# Patient Record
Sex: FEMALE | Race: WHITE | NOT HISPANIC OR LATINO | Employment: UNEMPLOYED | URBAN - METROPOLITAN AREA
[De-identification: names, ages, dates, MRNs, and addresses within clinical notes are randomized per-mention and may not be internally consistent; named-entity substitution may affect disease eponyms.]

---

## 2017-05-12 ENCOUNTER — GENERIC CONVERSION - ENCOUNTER (OUTPATIENT)
Dept: OTHER | Facility: OTHER | Age: 32
End: 2017-05-12

## 2017-05-12 DIAGNOSIS — Z34.81 ENCOUNTER FOR SUPERVISION OF OTHER NORMAL PREGNANCY, FIRST TRIMESTER: ICD-10-CM

## 2017-05-13 ENCOUNTER — TRANSCRIBE ORDERS (OUTPATIENT)
Dept: ADMINISTRATIVE | Facility: HOSPITAL | Age: 32
End: 2017-05-13

## 2017-05-13 ENCOUNTER — APPOINTMENT (OUTPATIENT)
Dept: LAB | Facility: HOSPITAL | Age: 32
End: 2017-05-13
Payer: COMMERCIAL

## 2017-05-13 DIAGNOSIS — Z34.81 ENCOUNTER FOR SUPERVISION OF OTHER NORMAL PREGNANCY, FIRST TRIMESTER: ICD-10-CM

## 2017-05-13 DIAGNOSIS — Z34.80 PRENATAL CARE, SUBSEQUENT PREGNANCY, UNSPECIFIED TRIMESTER: Primary | ICD-10-CM

## 2017-05-13 LAB
B-HCG SERPL-ACNC: 118 MIU/ML
PROGEST SERPL-MCNC: 19.4 NG/ML

## 2017-05-13 PROCEDURE — 36415 COLL VENOUS BLD VENIPUNCTURE: CPT

## 2017-05-13 PROCEDURE — 84144 ASSAY OF PROGESTERONE: CPT

## 2017-05-13 PROCEDURE — 84702 CHORIONIC GONADOTROPIN TEST: CPT

## 2017-05-15 ENCOUNTER — APPOINTMENT (OUTPATIENT)
Dept: LAB | Facility: HOSPITAL | Age: 32
End: 2017-05-15
Payer: COMMERCIAL

## 2017-05-15 DIAGNOSIS — Z34.80 PRENATAL CARE, SUBSEQUENT PREGNANCY, UNSPECIFIED TRIMESTER: ICD-10-CM

## 2017-05-15 LAB — VENIPUNCTURE: NORMAL

## 2017-05-15 PROCEDURE — 36415 COLL VENOUS BLD VENIPUNCTURE: CPT

## 2017-05-16 ENCOUNTER — LAB CONVERSION - ENCOUNTER (OUTPATIENT)
Dept: OTHER | Facility: OTHER | Age: 32
End: 2017-05-16

## 2017-05-16 LAB
HCG QUANTITATIVE (HISTORICAL): 298 MIU/ML
HCG QUANTITATIVE (HISTORICAL): 298 MIU/ML
PROGESTERONE LEVEL (HISTORICAL): 17.8 NG/ML

## 2017-06-12 ENCOUNTER — ALLSCRIPTS OFFICE VISIT (OUTPATIENT)
Dept: OTHER | Facility: OTHER | Age: 32
End: 2017-06-12

## 2017-06-12 DIAGNOSIS — Z34.81 ENCOUNTER FOR SUPERVISION OF OTHER NORMAL PREGNANCY, FIRST TRIMESTER: ICD-10-CM

## 2017-06-15 ENCOUNTER — LAB CONVERSION - ENCOUNTER (OUTPATIENT)
Dept: OTHER | Facility: OTHER | Age: 32
End: 2017-06-15

## 2017-06-15 LAB
CHLAMYDIA, LIQUID-BASED (HISTORICAL): NOT DETECTED
CULT RSLT GENITAL (HISTORICAL): NORMAL
GC BY MOL. METHOD (HISTORICAL): NOT DETECTED
HPV 18 (HISTORICAL): NOT DETECTED
HPV HIGH RISK 16/18 (HISTORICAL): NOT DETECTED
HPV16 (HISTORICAL): NOT DETECTED
PAP (HISTORICAL): NORMAL
TRICHOMONAS (HISTORICAL): NOT DETECTED

## 2017-07-10 ENCOUNTER — GENERIC CONVERSION - ENCOUNTER (OUTPATIENT)
Dept: OTHER | Facility: OTHER | Age: 32
End: 2017-07-10

## 2017-07-17 ENCOUNTER — GENERIC CONVERSION - ENCOUNTER (OUTPATIENT)
Dept: OTHER | Facility: OTHER | Age: 32
End: 2017-07-17

## 2017-07-31 ENCOUNTER — APPOINTMENT (OUTPATIENT)
Dept: LAB | Facility: CLINIC | Age: 32
End: 2017-07-31
Payer: COMMERCIAL

## 2017-07-31 ENCOUNTER — TRANSCRIBE ORDERS (OUTPATIENT)
Dept: LAB | Facility: CLINIC | Age: 32
End: 2017-07-31
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
## 2017-07-31 LAB
ABO GROUP BLD: NORMAL
BLD GP AB SCN SERPL QL: NEGATIVE
RH BLD: POSITIVE
SPECIMEN EXPIRATION DATE: NORMAL

## 2017-07-31 PROCEDURE — 82950 GLUCOSE TEST: CPT

## 2017-07-31 PROCEDURE — 86644 CMV ANTIBODY: CPT

## 2017-07-31 PROCEDURE — 80081 OBSTETRIC PANEL INC HIV TSTG: CPT

## 2017-07-31 PROCEDURE — 81003 URINALYSIS AUTO W/O SCOPE: CPT

## 2017-07-31 PROCEDURE — 87086 URINE CULTURE/COLONY COUNT: CPT

## 2017-07-31 PROCEDURE — 86803 HEPATITIS C AB TEST: CPT

## 2017-07-31 PROCEDURE — 36415 COLL VENOUS BLD VENIPUNCTURE: CPT

## 2017-07-31 PROCEDURE — 86645 CMV ANTIBODY IGM: CPT

## 2017-08-28 ENCOUNTER — GENERIC CONVERSION - ENCOUNTER (OUTPATIENT)
Dept: OTHER | Facility: OTHER | Age: 32
End: 2017-08-28

## 2017-09-11 ENCOUNTER — GENERIC CONVERSION - ENCOUNTER (OUTPATIENT)
Dept: OTHER | Facility: OTHER | Age: 32
End: 2017-09-11

## 2017-09-25 ENCOUNTER — GENERIC CONVERSION - ENCOUNTER (OUTPATIENT)
Dept: OTHER | Facility: OTHER | Age: 32
End: 2017-09-25

## 2017-10-30 ENCOUNTER — GENERIC CONVERSION - ENCOUNTER (OUTPATIENT)
Dept: OTHER | Facility: OTHER | Age: 32
End: 2017-10-30

## 2017-10-30 DIAGNOSIS — Z34.92 ENCOUNTER FOR SUPERVISION OF NORMAL PREGNANCY IN SECOND TRIMESTER: ICD-10-CM

## 2017-10-30 DIAGNOSIS — Z34.91 ENCOUNTER FOR SUPERVISION OF NORMAL PREGNANCY IN FIRST TRIMESTER: ICD-10-CM

## 2017-11-13 ENCOUNTER — TRANSCRIBE ORDERS (OUTPATIENT)
Dept: LAB | Facility: CLINIC | Age: 32
End: 2017-11-13

## 2017-11-13 ENCOUNTER — APPOINTMENT (OUTPATIENT)
Dept: LAB | Facility: CLINIC | Age: 32
End: 2017-11-13
Payer: COMMERCIAL

## 2017-11-13 DIAGNOSIS — Z34.91 ENCOUNTER FOR SUPERVISION OF NORMAL PREGNANCY IN FIRST TRIMESTER: ICD-10-CM

## 2017-11-13 LAB
BASOPHILS # BLD AUTO: 0.03 THOUSANDS/ΜL (ref 0–0.1)
BASOPHILS NFR BLD AUTO: 0 % (ref 0–1)
EOSINOPHIL # BLD AUTO: 0.02 THOUSAND/ΜL (ref 0–0.61)
EOSINOPHIL NFR BLD AUTO: 0 % (ref 0–6)
ERYTHROCYTE [DISTWIDTH] IN BLOOD BY AUTOMATED COUNT: 13.1 % (ref 11.6–15.1)
GLUCOSE 1H P 50 G GLC PO SERPL-MCNC: 155 MG/DL
HCT VFR BLD AUTO: 31.2 % (ref 34.8–46.1)
HGB BLD-MCNC: 10.4 G/DL (ref 11.5–15.4)
LYMPHOCYTES # BLD AUTO: 1.44 THOUSANDS/ΜL (ref 0.6–4.47)
LYMPHOCYTES NFR BLD AUTO: 15 % (ref 14–44)
MCH RBC QN AUTO: 30.5 PG (ref 26.8–34.3)
MCHC RBC AUTO-ENTMCNC: 33.3 G/DL (ref 31.4–37.4)
MCV RBC AUTO: 92 FL (ref 82–98)
MONOCYTES # BLD AUTO: 0.71 THOUSAND/ΜL (ref 0.17–1.22)
MONOCYTES NFR BLD AUTO: 7 % (ref 4–12)
NEUTROPHILS # BLD AUTO: 7.45 THOUSANDS/ΜL (ref 1.85–7.62)
NEUTS SEG NFR BLD AUTO: 78 % (ref 43–75)
PLATELET # BLD AUTO: 196 THOUSANDS/UL (ref 149–390)
PMV BLD AUTO: 9 FL (ref 8.9–12.7)
RBC # BLD AUTO: 3.41 MILLION/UL (ref 3.81–5.12)
WBC # BLD AUTO: 9.65 THOUSAND/UL (ref 4.31–10.16)

## 2017-11-13 PROCEDURE — 82950 GLUCOSE TEST: CPT

## 2017-11-13 PROCEDURE — 36415 COLL VENOUS BLD VENIPUNCTURE: CPT

## 2017-11-13 PROCEDURE — 85025 COMPLETE CBC W/AUTO DIFF WBC: CPT

## 2017-11-15 ENCOUNTER — GENERIC CONVERSION - ENCOUNTER (OUTPATIENT)
Dept: OTHER | Facility: OTHER | Age: 32
End: 2017-11-15

## 2017-11-18 ENCOUNTER — APPOINTMENT (OUTPATIENT)
Dept: LAB | Facility: CLINIC | Age: 32
End: 2017-11-18
Payer: COMMERCIAL

## 2017-11-18 DIAGNOSIS — Z34.92 ENCOUNTER FOR SUPERVISION OF NORMAL PREGNANCY IN SECOND TRIMESTER: ICD-10-CM

## 2017-11-18 LAB
GLUCOSE 1H P 100 G GLC PO SERPL-MCNC: 158 MG/DL (ref 65–179)
GLUCOSE 2H P 100 G GLC PO SERPL-MCNC: 192 MG/DL (ref 65–154)
GLUCOSE 3H P 100 G GLC PO SERPL-MCNC: 123 MG/DL (ref 65–139)
GLUCOSE P FAST SERPL-MCNC: 84 MG/DL (ref 65–99)

## 2017-11-18 PROCEDURE — 82952 GTT-ADDED SAMPLES: CPT

## 2017-11-18 PROCEDURE — 36415 COLL VENOUS BLD VENIPUNCTURE: CPT

## 2017-11-18 PROCEDURE — 82948 REAGENT STRIP/BLOOD GLUCOSE: CPT

## 2017-11-18 PROCEDURE — 82951 GLUCOSE TOLERANCE TEST (GTT): CPT

## 2017-11-22 LAB — GLUCOSE SERPL-MCNC: 75 MG/DL (ref 65–140)

## 2017-11-27 ENCOUNTER — GENERIC CONVERSION - ENCOUNTER (OUTPATIENT)
Dept: OTHER | Facility: OTHER | Age: 32
End: 2017-11-27

## 2017-12-11 ENCOUNTER — GENERIC CONVERSION - ENCOUNTER (OUTPATIENT)
Dept: OTHER | Facility: OTHER | Age: 32
End: 2017-12-11

## 2017-12-11 ENCOUNTER — ALLSCRIPTS OFFICE VISIT (OUTPATIENT)
Dept: OTHER | Facility: OTHER | Age: 32
End: 2017-12-11

## 2018-01-04 ENCOUNTER — GENERIC CONVERSION - ENCOUNTER (OUTPATIENT)
Dept: OTHER | Facility: OTHER | Age: 33
End: 2018-01-04

## 2018-01-08 ENCOUNTER — GENERIC CONVERSION - ENCOUNTER (OUTPATIENT)
Dept: OBGYN CLINIC | Facility: CLINIC | Age: 33
End: 2018-01-08

## 2018-01-10 ENCOUNTER — GENERIC CONVERSION - ENCOUNTER (OUTPATIENT)
Dept: OTHER | Facility: OTHER | Age: 33
End: 2018-01-10

## 2018-01-13 VITALS
SYSTOLIC BLOOD PRESSURE: 96 MMHG | DIASTOLIC BLOOD PRESSURE: 60 MMHG | BODY MASS INDEX: 23.57 KG/M2 | HEIGHT: 63 IN | WEIGHT: 133 LBS

## 2018-01-15 ENCOUNTER — ANESTHESIA EVENT (INPATIENT)
Dept: LABOR AND DELIVERY | Facility: HOSPITAL | Age: 33
End: 2018-01-15
Payer: COMMERCIAL

## 2018-01-15 ENCOUNTER — ANESTHESIA (INPATIENT)
Dept: LABOR AND DELIVERY | Facility: HOSPITAL | Age: 33
End: 2018-01-15
Payer: COMMERCIAL

## 2018-01-15 ENCOUNTER — HOSPITAL ENCOUNTER (INPATIENT)
Facility: HOSPITAL | Age: 33
LOS: 2 days | Discharge: HOME | End: 2018-01-17
Attending: OBSTETRICS & GYNECOLOGY | Admitting: OBSTETRICS & GYNECOLOGY
Payer: COMMERCIAL

## 2018-01-15 ENCOUNTER — HOSPITAL ENCOUNTER (OUTPATIENT)
Dept: LABOR AND DELIVERY | Facility: HOSPITAL | Age: 33
Discharge: HOME | End: 2018-01-15
Payer: COMMERCIAL

## 2018-01-15 DIAGNOSIS — Z3A.39 39 WEEKS GESTATION OF PREGNANCY: Primary | ICD-10-CM

## 2018-01-15 PROBLEM — J30.2 ALLERGIC RHINITIS, SEASONAL: Status: ACTIVE | Noted: 2018-01-15

## 2018-01-15 PROBLEM — Z34.93 ENCOUNTER FOR PREGNANCY RELATED EXAMINATION IN THIRD TRIMESTER: Status: ACTIVE | Noted: 2018-01-15

## 2018-01-15 LAB
ABO GROUP BLD: NORMAL
BLD GP AB SCN SERPL QL: NEGATIVE
ERYTHROCYTE [DISTWIDTH] IN BLOOD BY AUTOMATED COUNT: 15.4 % (ref 11.6–15.1)
HCT VFR BLD AUTO: 31.5 % (ref 34.8–46.1)
HGB BLD-MCNC: 10.5 G/DL (ref 11.5–15.4)
MCH RBC QN AUTO: 28.6 PG (ref 26.8–34.3)
MCHC RBC AUTO-ENTMCNC: 33.3 G/DL (ref 31.4–37.4)
MCV RBC AUTO: 86 FL (ref 82–98)
PLATELET # BLD AUTO: 175 THOUSANDS/UL (ref 149–390)
PMV BLD AUTO: 8.9 FL (ref 8.9–12.7)
RBC # BLD AUTO: 3.67 MILLION/UL (ref 3.81–5.12)
RH BLD: POSITIVE
SPECIMEN EXPIRATION DATE: NORMAL
WBC # BLD AUTO: 9.03 THOUSAND/UL (ref 4.31–10.16)

## 2018-01-15 PROCEDURE — 85027 COMPLETE CBC AUTOMATED: CPT | Performed by: OBSTETRICS & GYNECOLOGY

## 2018-01-15 PROCEDURE — 4A1HXCZ MONITORING OF PRODUCTS OF CONCEPTION, CARDIAC RATE, EXTERNAL APPROACH: ICD-10-PCS | Performed by: OBSTETRICS & GYNECOLOGY

## 2018-01-15 PROCEDURE — 86900 BLOOD TYPING SEROLOGIC ABO: CPT | Performed by: OBSTETRICS & GYNECOLOGY

## 2018-01-15 PROCEDURE — 86850 RBC ANTIBODY SCREEN: CPT | Performed by: OBSTETRICS & GYNECOLOGY

## 2018-01-15 PROCEDURE — 86901 BLOOD TYPING SEROLOGIC RH(D): CPT | Performed by: OBSTETRICS & GYNECOLOGY

## 2018-01-15 PROCEDURE — 86592 SYPHILIS TEST NON-TREP QUAL: CPT | Performed by: OBSTETRICS & GYNECOLOGY

## 2018-01-15 RX ORDER — SODIUM CHLORIDE, SODIUM LACTATE, POTASSIUM CHLORIDE, CALCIUM CHLORIDE 600; 310; 30; 20 MG/100ML; MG/100ML; MG/100ML; MG/100ML
125 INJECTION, SOLUTION INTRAVENOUS CONTINUOUS
Status: DISCONTINUED | OUTPATIENT
Start: 2018-01-15 | End: 2018-01-16

## 2018-01-15 RX ORDER — ONDANSETRON 2 MG/ML
4 INJECTION INTRAMUSCULAR; INTRAVENOUS EVERY 6 HOURS PRN
Status: DISCONTINUED | OUTPATIENT
Start: 2018-01-15 | End: 2018-01-16

## 2018-01-15 RX ORDER — OXYTOCIN/RINGER'S LACTATE 30/500 ML
1-30 PLASTIC BAG, INJECTION (ML) INTRAVENOUS
Status: DISCONTINUED | OUTPATIENT
Start: 2018-01-15 | End: 2018-01-16

## 2018-01-15 RX ORDER — LIDOCAINE HYDROCHLORIDE AND EPINEPHRINE 15; 5 MG/ML; UG/ML
INJECTION, SOLUTION EPIDURAL AS NEEDED
Status: DISCONTINUED | OUTPATIENT
Start: 2018-01-15 | End: 2018-01-16 | Stop reason: SURG

## 2018-01-15 RX ADMIN — LIDOCAINE HYDROCHLORIDE AND EPINEPHRINE 3 ML: 15; 5 INJECTION, SOLUTION EPIDURAL at 19:18

## 2018-01-15 RX ADMIN — SODIUM CHLORIDE 5 MILLION UNITS: 0.9 INJECTION, SOLUTION INTRAVENOUS at 15:44

## 2018-01-15 RX ADMIN — SODIUM CHLORIDE, POTASSIUM CHLORIDE, SODIUM LACTATE AND CALCIUM CHLORIDE 125 ML/HR: 600; 310; 30; 20 INJECTION, SOLUTION INTRAVENOUS at 19:35

## 2018-01-15 RX ADMIN — SODIUM CHLORIDE, POTASSIUM CHLORIDE, SODIUM LACTATE AND CALCIUM CHLORIDE 125 ML/HR: 600; 310; 30; 20 INJECTION, SOLUTION INTRAVENOUS at 15:05

## 2018-01-15 RX ADMIN — LIDOCAINE HYDROCHLORIDE AND EPINEPHRINE 3 ML: 15; 5 INJECTION, SOLUTION EPIDURAL at 19:17

## 2018-01-15 RX ADMIN — SODIUM CHLORIDE 2.5 MILLION UNITS: 9 INJECTION, SOLUTION INTRAVENOUS at 19:50

## 2018-01-15 RX ADMIN — SODIUM CHLORIDE 2.5 MILLION UNITS: 9 INJECTION, SOLUTION INTRAVENOUS at 23:53

## 2018-01-15 RX ADMIN — SODIUM CHLORIDE, POTASSIUM CHLORIDE, SODIUM LACTATE AND CALCIUM CHLORIDE 125 ML/HR: 600; 310; 30; 20 INJECTION, SOLUTION INTRAVENOUS at 17:28

## 2018-01-15 RX ADMIN — Medication 1 MILLI-UNITS/MIN: at 15:38

## 2018-01-15 RX ADMIN — ROPIVACAINE HYDROCHLORIDE: 2 INJECTION, SOLUTION EPIDURAL; INFILTRATION at 19:15

## 2018-01-15 NOTE — ANESTHESIA PREPROCEDURE EVALUATION
Review of Systems/Medical History  Patient summary reviewed        Cardiovascular  Negative cardio ROS    Pulmonary  Negative pulmonary ROS ,        GI/Hepatic  Negative GI/hepatic ROS          Negative  ROS        Endo/Other  Negative endo/other ROS      GYN  Negative gynecology ROS          Hematology  Anemia ,     Musculoskeletal  Negative musculoskeletal ROS        Neurology  Negative neurology ROS      Psychology   Negative psychology ROS            Physical Exam    Airway    Mallampati score: II  TM Distance: >3 FB  Neck ROM: full     Dental       Cardiovascular  Comment: Negative ROS, Cardiovascular exam normal    Pulmonary  Pulmonary exam normal     Other Findings        Anesthesia Plan  ASA Score- 2     Anesthesia Type- epidural with ASA Monitors  Additional Monitors:   Airway Plan:         Plan Factors-    Induction- intravenous  Postoperative Plan-     Informed Consent- Anesthetic plan and risks discussed with patient and spouse

## 2018-01-15 NOTE — PLAN OF CARE
BIRTH - VAGINAL/ SECTION     Fetal and maternal status remain reassuring during the birth process [de-identified]     Emotionally satisfying birthing experience for mother/fetus [address] [place] [place] Discharge to home or other facility with appropriate resources Progressing        INFECTION - ADULT     Absence or prevention of progression during hospitalization Progressing     Absence of fever/infection during neutropenic period Progressing        Knowledge Deficit     Patient/family/caregiver demonstrates understanding of disease process, treatment plan, medications, and discharge instructions Progressing        PAIN - ADULT     Verbalizes/displays adequate comfort level or baseline comfort level Progressing        SAFETY ADULT     Patient will remain free of falls Progressing     Maintain or return to baseline ADL function Progressing     Maintain or return mobility status to optimal level Progressing

## 2018-01-15 NOTE — H&P
History & Physical - OB/GYN   Mali Gerard 28 y o  female MRN: [id number]  Unit/Bed#: -01 Encounter: [id number]    28 y o  [id number] at 39w1d by LMP  She is a patient of Dr Puja Robles    Chief complaint:  Elective Induction of Labor, for trial of labor after , successful vaginal birth after  x 1    HPI:  Contractions:  no  Fetal movement:  yes  Vaginal bleeding:   no  Leaking of fluid:  no      Pregnancy Complications:  Patient Active Problem List   Diagnosis    Allergic rhinitis, seasonal    Encounter for pregnancy related examination in third trimester    39 weeks gestation of pregnancy       PMH:  Past Medical History:   Diagnosis Date    Anemia     with pregnancy on iron     Seasonal allergies     Varicella     had as child and received vaccine    Visual impairment     eyewear        PSH:  Past Surgical History:   Procedure Laterality Date     SECTION      KNEE CARTILAGE SURGERY Right            Social Hx:  Henry x 3     OB Hx:  Obstetric History       T2      L2     SAB1   TAB0   Ectopic0   Multiple0   Live Births2       # Outcome Date GA Lbr Calos/2nd Weight Sex Delivery Anes PTL Lv   4 Current            3 2016 7w0d          2 Term 02/23/15 39w0d  3515 g (7 lb 12 oz) F  EPI N ANGEL   1 Term 13 39w0d  4196 g (9 lb 4 oz) F CS-Unspec Spinal N ANGEL          Meds:  No current facility-administered medications on file prior to encounter  No current outpatient prescriptions on file prior to encounter  Allergies:  No Known Allergies    Labs:  Blood type: A+  Antibody: negative  Group B strep: positive  HIV: negative  Hepatitis B: negative  RPR: NR  Rubella: Immune  Varicella Unknown  1 hour Glucose: 155  3 hour Glucose: 84, 158, 192, 123    Physical Exam:  /72   Pulse (!) 114   Temp 97 9 °F (36 6 °C) (Oral)   Resp 18   LMP 2017     Physical Exam   Constitutional: She is oriented to person, place, and time   She appears well-developed and well-nourished  Cardiovascular: Normal rate, regular rhythm and normal heart sounds  Pulmonary/Chest: Effort normal and breath sounds normal    Abdominal: Soft  Bowel sounds are normal  She exhibits distension  Gravid   Neurological: She is alert and oriented to person, place, and time  Estimated Fetal Weight: 8 lbs  Presentation: vertex    SVE: 2 / 60% / -1  FHT:  120 / Moderate 6 - 25 bpm / + accelerations, no decelerations  [place]: irritable    Membranes: intact    Assessment:   28 y o  [id number] at 39w1d for elective induction of labor    Plan:   1  Admit to L&D  2  CBC, RPR, type and screen  3  GBS + status: PCN per protocol  4   Induction of labor: pitocin per protocol    Epidural upon request    Discussed with Dr Adam Corado DO

## 2018-01-16 PROBLEM — O34.219 VBAC, DELIVERED: Status: ACTIVE | Noted: 2018-01-16

## 2018-01-16 LAB
BASE EXCESS BLDCOA CALC-SCNC: -3.4 MMOL/L (ref 3–11)
BASE EXCESS BLDCOV CALC-SCNC: -2.1 MMOL/L (ref 1–9)
HCO3 BLDCOA-SCNC: 24.5 MMOL/L (ref 17.3–27.3)
HCO3 BLDCOV-SCNC: 22.5 MMOL/L (ref 12.2–28.6)
O2 CT VFR BLDCOA CALC: 8.6 ML/DL
OXYHGB MFR BLDCOA: 36.3 %
OXYHGB MFR BLDCOV: 82.9 %
PCO2 BLDCOA: 54.6 MM[HG] (ref 30–60)
PCO2 BLDCOV: 38.8 MM HG (ref 27–43)
PH BLDCOA: 7.27 [PH] (ref 7.23–7.43)
PH BLDCOV: 7.38 [PH] (ref 7.19–7.49)
PO2 BLDCOA: 18.5 MM HG (ref 5–25)
PO2 BLDCOV: 37.1 MM HG (ref 15–45)
RPR SER QL: NORMAL
SAO2 % BLDCOV: 21 ML/DL

## 2018-01-16 PROCEDURE — 82805 BLOOD GASES W/O2 SATURATION: CPT | Performed by: OBSTETRICS & GYNECOLOGY

## 2018-01-16 RX ORDER — OXYCODONE HYDROCHLORIDE AND ACETAMINOPHEN 5; 325 MG/1; MG/1
2 TABLET ORAL EVERY 4 HOURS PRN
Status: DISCONTINUED | OUTPATIENT
Start: 2018-01-16 | End: 2018-01-17 | Stop reason: HOSPADM

## 2018-01-16 RX ORDER — ACETAMINOPHEN 325 MG/1
650 TABLET ORAL EVERY 6 HOURS PRN
Status: DISCONTINUED | OUTPATIENT
Start: 2018-01-16 | End: 2018-01-17 | Stop reason: HOSPADM

## 2018-01-16 RX ORDER — DIPHENHYDRAMINE HYDROCHLORIDE 50 MG/ML
25 INJECTION INTRAMUSCULAR; INTRAVENOUS EVERY 6 HOURS PRN
Status: DISCONTINUED | OUTPATIENT
Start: 2018-01-16 | End: 2018-01-17 | Stop reason: HOSPADM

## 2018-01-16 RX ORDER — ONDANSETRON 2 MG/ML
4 INJECTION INTRAMUSCULAR; INTRAVENOUS EVERY 8 HOURS PRN
Status: DISCONTINUED | OUTPATIENT
Start: 2018-01-16 | End: 2018-01-17 | Stop reason: HOSPADM

## 2018-01-16 RX ORDER — DOCUSATE SODIUM 100 MG/1
100 CAPSULE, LIQUID FILLED ORAL 2 TIMES DAILY
Status: DISCONTINUED | OUTPATIENT
Start: 2018-01-16 | End: 2018-01-17 | Stop reason: HOSPADM

## 2018-01-16 RX ORDER — OXYCODONE HYDROCHLORIDE AND ACETAMINOPHEN 5; 325 MG/1; MG/1
1 TABLET ORAL EVERY 4 HOURS PRN
Status: DISCONTINUED | OUTPATIENT
Start: 2018-01-16 | End: 2018-01-17 | Stop reason: HOSPADM

## 2018-01-16 RX ORDER — IBUPROFEN 600 MG/1
600 TABLET ORAL EVERY 6 HOURS PRN
Status: DISCONTINUED | OUTPATIENT
Start: 2018-01-16 | End: 2018-01-17 | Stop reason: HOSPADM

## 2018-01-16 RX ORDER — CALCIUM CARBONATE 200(500)MG
1000 TABLET,CHEWABLE ORAL DAILY PRN
Status: DISCONTINUED | OUTPATIENT
Start: 2018-01-16 | End: 2018-01-17 | Stop reason: HOSPADM

## 2018-01-16 RX ORDER — DIAPER,BRIEF,INFANT-TODD,DISP
1 EACH MISCELLANEOUS AS NEEDED
Status: DISCONTINUED | OUTPATIENT
Start: 2018-01-16 | End: 2018-01-17 | Stop reason: HOSPADM

## 2018-01-16 RX ADMIN — BENZOCAINE AND MENTHOL: 20; .5 SPRAY TOPICAL at 06:01

## 2018-01-16 RX ADMIN — DOCUSATE SODIUM 100 MG: 100 CAPSULE, LIQUID FILLED ORAL at 17:22

## 2018-01-16 RX ADMIN — IBUPROFEN 600 MG: 600 TABLET ORAL at 08:49

## 2018-01-16 RX ADMIN — DOCUSATE SODIUM 100 MG: 100 CAPSULE, LIQUID FILLED ORAL at 08:49

## 2018-01-16 RX ADMIN — IBUPROFEN 600 MG: 600 TABLET ORAL at 15:04

## 2018-01-16 RX ADMIN — HYDROCORTISONE 1 APPLICATION: 1 CREAM TOPICAL at 06:01

## 2018-01-16 RX ADMIN — WITCH HAZEL 1 PAD: 500 SOLUTION RECTAL; TOPICAL at 06:01

## 2018-01-16 NOTE — LACTATION NOTE
This note was copied from a baby's chart  CONSULT - LACTATION  Baby Girl Shanell Stacy 0 days female MRN: [id number]    [facility] Room / Bed:  311(N)/ 311(N) Encounter: [id number]    Maternal Information     MOTHER:  Stephenie Harvey  Maternal Age: 28 y o    OB History: #: 1, Date: 13, Sex: Female, Weight: 4196 g (9 lb 4 oz), GA: 39w0d, Delivery: , Unspecified, Apgar1: None, Apgar5: None, Living: Living, Birth Comments: None    #: 2, Date: 02/23/15, Sex: Female, Weight: 3515 g (7 lb 12 oz), GA: 39w0d, Delivery: , Spontaneous, Apgar1: None, Apgar5: None, Living: Living, Birth Comments: None    #: 3, Date: , Sex: None, Weight: None, GA: 7w0d, Delivery: None, Apgar1: None, Apgar5: None, Living: None, Birth Comments: None    #: 4, Date: 18, Sex: Female, Weight: 3765 g (8 lb 4 8 oz), GA: 39w2d, Delivery: Vaginal, Spontaneous Delivery, Apgar1: 9, Apgar5: 9, Living: Living, Birth Comments: None   Previouse breast reduction surgery?  No    Lactation history:   Has patient previously breast fed: Yes   How long had patient previously breast fed: 2nd one  for one year, 1st one was in nicu with NG tube exclusively  with pumped breastmilk for five month   Previous breast feeding complications: Infant separation     Past Surgical History:   Procedure Laterality Date     SECTION      KNEE CARTILAGE SURGERY Right            Birth information:  YOB: 2018   Time of birth: 3:42 AM   Sex: female   Delivery type: Vaginal, Spontaneous Delivery   Birth Weight: 3765 g (8 lb 4 8 oz)   Percent of Weight Change: 0%     Gestational Age: 44w2d   [unfilled]    Assessment     Breast and nipple assessment: normal assessment    [place] Assessment: normal assessment    Feeding assessment: feeding well  LATCH:  Latch: Grasps breast, tongue down, lips flanged, rhythmic sucking   Audible Swallowing: Spontaneous and intermittent (24 hours old)   Type of Nipple: Everted (After stimulation)   Comfort (Breast/Nipple): Soft/non-tender   Hold (Positioning): No assist from staff, mother able to position/hold infant   LATCH Score: 10          Feeding recommendations:  breast feed on demand     Met with mother  Provided mother with Ready, Set, Baby booklet  Discussed Skin to Skin contact an benefits to mom and baby  Talked about the delay of the first bath until baby has adjusted  Spoke about the benefits of rooming in  Feeding on cue and what that means for recognizing infant's hunger  Avoidance of pacifiers for the first month discussed  Talked about exclusive breastfeeding for the first 6 months  Positioning and latch reviewed as well as showing images of other feeding positions  Discussed the properties of a good latch in any position  Reviewed hand/manual expression  Discussed s/s that baby is getting enough milk and some s/s that breastfeeding dyad may need further help  Gave information on common concerns, what to expect the first few weeks after delivery, preparing for other caregivers, and how partners can help  Resources for support also provided  Information on hand expression given  Discussed benefits of knowing how to manually express breast including stimulating milk supply, softening nipple for latch and evacuating breast in the event of engorgement  Discussed with MOB how to download the [employer] to utilize feeding log in the application  Encouraged MOB to call for assistance, questions, and concerns about breastfeeding  Extension provided          Ángela Jones RN 1/16/2018 4:51 PM

## 2018-01-16 NOTE — OB LABOR/OXYTOCIN SAFETY PROGRESS
Oxytocin Safety Progress Check Note - Jose Raul Jones 28 y o  female MRN: [id number]    Unit/Bed#: -01 Encounter: [id number]    Obstetric History       T2      L2     SAB1   TAB0   Ectopic0   Multiple0   Live Births2      Gestational Age: 39w1d  Dose (ta-units/min) Oxytocin: 6 ta-units/min  Contraction Frequency (minutes): 4  Contraction Quality: Moderate  Tachysystole: No   Dilation: 3-4        Effacement (%): 90  Station: -1  Baseline Rate: 110 bpm  Fetal Heart Rate: 120 BPM  FHR Category: Category I     Oxytocin Safety Progress Check: Safety check completed    Notes/comments:     Comfortable with epidural  AROM for augmentation  Continue pitocin titration    Nery Coleman MD 1/15/2018 8:25 PM

## 2018-01-16 NOTE — DISCHARGE SUMMARY
DISCHARGE SUMMARY - OB  Bryan Mohr 28 y o  female MRN: [id number]                        Unit/Bed#: -01 Encounter: [id number]      Admission Date: 1/15/2018    Discharge Date: 18    Attending:  Ileana Saldivar    Principal Diagnosis: , delivered    Secondary Diagnosis:   Patient Active Problem List   Diagnosis    Allergic rhinitis, seasonal    Encounter for pregnancy related examination in third trimester    39 weeks gestation of pregnancy    , delivered       Procedures: Stormy Barboza 19 Course: Pt was admitted for elective induction for TOLAC  She was started on Pitocin and labor was augmented with AROM  She successfully delivered a female  by  at [phone] on 2018  Apgars were 9/9  Pt had no lacerations  Baby was admitted to  nursery and mother was transferred to post partum  On discharge,     Complications: None    Condition at discharge: stable    Discharge Medications: For a complete list of the patient's medications, please refer to her medical reconcillation report  Discharge instructions/Information to patient and family:   See after visit summary for information provided to patient and family  Provisions for Follow-Up Care:  See after visit summary for information related to follow-up care and any pertinent home health orders  Disposition: See After Visit Summary for discharge disposition information

## 2018-01-16 NOTE — OB LABOR/OXYTOCIN SAFETY PROGRESS
Oxytocin Safety Progress Check Note - Comfort Juárez 28 y o  female MRN: [id number]    Unit/Bed#: -01 Encounter: [id number]    Obstetric History       T2      L2     SAB1   TAB0   Ectopic0   Multiple0   Live Births2      Gestational Age: 39w2d  Dose (at-units/min) Oxytocin: 14 ta-units/min  Contraction Frequency (minutes): 2-2 5  Contraction Quality: Moderate  Tachysystole: No   Dilation: 6        Effacement (%): 90  Station: -1  Baseline Rate: 105 bpm  Fetal Heart Rate: 110 BPM  FHR Category: Category I     Oxytocin Safety Progress Check: Safety check completed    Notes/comments:   Patient is comfortable with epidural, making cervical change    Continue to monitor  Provider Notified: Yes  Provider Name: Dr Shannon Wooten MD 2018 1:43 AM

## 2018-01-16 NOTE — OB LABOR/OXYTOCIN SAFETY PROGRESS
Oxytocin Safety Progress Check Note - Blanca Nail 28 y o  female MRN: [id number]    Unit/Bed#: -01 Encounter: [id number]    Obstetric History       T2      L2     SAB1   TAB0   Ectopic0   Multiple0   Live Births2      Gestational Age: 39w1d  Dose (ta-units/min) Oxytocin: 10 ta-units/min  Contraction Frequency (minutes): 2-3 5  Contraction Quality: Moderate  Tachysystole: No   Dilation: 4        Effacement (%): 90  Station: -1  Baseline Rate: 105 bpm  Fetal Heart Rate: 110 BPM  FHR Category: Category I     Oxytocin Safety Progress Check: Safety check completed    Notes/comments:   Patient is currently comfortable with epidural, MVUs approximately 100, making cervical change on exam   Continue to monitor    Provider Notified: Yes  Provider Name: Dr Pankaj Granados MD 1/15/2018 11:25 PM

## 2018-01-16 NOTE — L&D DELIVERY NOTE
DELIVERY NOTE  Gabriela De Jesus 28 y o  female MRN: [id number]  Unit/Bed#: -01 Encounter: [id number]    Obstetrician:   Dr Boucher     Assistant: Dr Leeann Youngblood    Pre-Delivery Diagnosis: Elective induction of labor for TOLAC  Term pregnancy    Post-Delivery Diagnosis: Same as above - Delivered  Successful     Procedure: Spontaneous vaginal delivery    Indications for instrumental delivery: none    Estimated Blood Loss:  Minimal           Complications:  none    Specimens: Placenta to storage    Description of Delivery: Patient delivered a viable Female  over intact perineum with no lacerations  After delivery of the fetal head, nuchal cord x1 was assessed and found to be present and removed  With the assistance of maternal expulsive efforts and gentle guidance the  was delivered  After delivery of the , when the pulsations of the umbilical cord had ceased,  the umbilical cord was doubly clamped and cut and the  was passed off to  staff for routine care  Umbilical cord blood and umbilical artery and venous gases were collected  Placenta was delivered with fundal massage and gentle traction on the cord  Placenta delivered intact with a 3-vessel cord  Bleeding was noted to be under control  Inspection of the perineum, vagina, labia, and urethra revealed no lacerations  Mother and baby are currently recovering nicely in stable condition, and bonding with skin to skin  All needles, sponges and instruments were accounted for x2  Dr Rae was present and scrubbed in throughout the entire procedure            Agree with above

## 2018-01-16 NOTE — OB LABOR/OXYTOCIN SAFETY PROGRESS
Oxytocin Safety Progress Check Note - Loren Sharpe 28 y o  female MRN: [id number]    Unit/Bed#: -01 Encounter: [id number]    Obstetric History       T2      L2     SAB1   TAB0   Ectopic0   Multiple0   Live Births2      Gestational Age: 39w1d  Dose (ta-units/min) Oxytocin: 8 ta-units/min  Contraction Frequency (minutes): 3  Contraction Quality: Moderate  Tachysystole: No   Dilation: 3-4        Effacement (%): 90  Station: -1  Baseline Rate: 110 bpm  Fetal Heart Rate: 135 BPM  FHR Category: Category I     Oxytocin Safety Progress Check: Safety check completed    Notes/comments:     Comfortable with epidural  No cervical change  IUPC placed to help guide pitocin titration  Continue to observe     Lary Rebolledo MD 1/15/2018 9:23 PM

## 2018-01-16 NOTE — ANESTHESIA PROCEDURE NOTES
Epidural Block    Patient location during procedure: OB  Start time: 1/15/2018 7:22 PM  Reason for block: procedure for pain and at surgeon's request  Staffing  Anesthesiologist: Petar Bates  Performed: anesthesiologist   Preanesthetic Checklist  Completed: patient identified, site marked, surgical consent, pre-op evaluation, timeout performed, IV checked, risks and benefits discussed and monitors and equipment checked  Epidural  Patient position: sitting  Prep: Betadine  Patient monitoring: cardiac monitor, frequent blood pressure checks and continuous pulse ox  Approach: midline  Location: lumbar (1-5)  Injection technique: KUSHAL air  Needle  Needle type: Tuohy   Needle gauge: 18 G  Catheter type: side hole  Catheter size: 20 G  Catheter at skin depth: 11 cm  Test dose: negative and lidocaine 1 5% with epinephrine 1-to-200,000  Assessment  Sensory level: [id number] aspiration for CSF, negative aspiration for heme and no paresthesia on injection  patient tolerated the procedure well with no immediate complications

## 2018-01-16 NOTE — PLAN OF CARE
BIRTH - VAGINAL/ SECTION     Fetal and maternal status remain reassuring during the birth process Completed     Emotionally satisfying birthing experience for mother/fetus Completed          DISCHARGE PLANNING     Discharge to home or other facility with appropriate resources Progressing        INFECTION - ADULT     Absence or prevention of progression during hospitalization Progressing     Absence of fever/infection during neutropenic period Progressing        Knowledge Deficit     Patient/family/caregiver demonstrates understanding of disease process, treatment plan, medications, and discharge instructions Progressing        PAIN - ADULT     Verbalizes/displays adequate comfort level or baseline comfort level Progressing        POSTPARTUM     Experiences normal postpartum course Progressing     Appropriate maternal -  bonding Progressing     Establishment of infant feeding pattern Progressing     Incision(s), wounds(s) or drain site(s) healing without S/S of infection Progressing        SAFETY ADULT     Patient will remain free of falls Progressing     Maintain or return to baseline ADL function Progressing     Maintain or return mobility status to optimal level Progressing

## 2018-01-16 NOTE — OB LABOR/OXYTOCIN SAFETY PROGRESS
Oxytocin Safety Progress Check Note - Maricruz Ellis 28 y o  female MRN: [id number]    Unit/Bed#: -01 Encounter: [id number]    Obstetric History       T2      L2     SAB1   TAB0   Ectopic0   Multiple0   Live Births2      Gestational Age: 39w2d  Dose (ta-units/min) Oxytocin: 14 ta-units/min  Contraction Frequency (minutes): 1-5  Contraction Quality: Moderate  Tachysystole: No   Dilation: 7        Effacement (%): 90  Station: 0  Baseline Rate: 120 bpm  Fetal Heart Rate: 110 BPM  FHR Category: Category I     Oxytocin Safety Progress Check: Safety check completed    Notes/comments:     Provider Notified: Yes  Provider Name: Dr Bishop Taylor    Patient feeling increased pressure  Continue current management      Grzegorz Tamayo MD 2018 2:45 AM

## 2018-01-16 NOTE — SOCIAL WORK
Breast pump consult  Spoke with pt who states she is interested in a very new pump called [facility] which is a hands-free, tube-free, cordless, portable system  Pt states she will contact her insurance to determine if she can order pump herself and submit a claim for reimbursement  Pt states she has Rx from her OB  Pt denies any CM needs at this time  Pt has CM contact info and is to call CM if any needs before d/c  No other needs noted at this time

## 2018-01-17 VITALS
HEART RATE: 70 BPM | DIASTOLIC BLOOD PRESSURE: 68 MMHG | TEMPERATURE: 97.5 F | RESPIRATION RATE: 18 BRPM | BODY MASS INDEX: 29.77 KG/M2 | SYSTOLIC BLOOD PRESSURE: 104 MMHG | OXYGEN SATURATION: 97 % | HEIGHT: 63 IN | WEIGHT: 168 LBS

## 2018-01-17 RX ORDER — ACETAMINOPHEN 325 MG/1
TABLET ORAL
Qty: 30 TABLET | Refills: 0
Start: 2018-01-17 | End: 2018-02-16

## 2018-01-17 RX ORDER — DOCUSATE SODIUM 100 MG/1
100 CAPSULE, LIQUID FILLED ORAL 2 TIMES DAILY
Qty: 10 CAPSULE | Refills: 0
Start: 2018-01-17 | End: 2018-02-16

## 2018-01-17 RX ORDER — DIAPER,BRIEF,INFANT-TODD,DISP
1 EACH MISCELLANEOUS 4 TIMES DAILY PRN
Qty: 30 G | Refills: 0
Start: 2018-01-17 | End: 2018-02-16

## 2018-01-17 RX ORDER — IBUPROFEN 600 MG/1
600 TABLET ORAL EVERY 6 HOURS PRN
Qty: 30 TABLET | Refills: 0
Start: 2018-01-17 | End: 2018-02-16

## 2018-01-17 RX ADMIN — IBUPROFEN 600 MG: 600 TABLET ORAL at 08:26

## 2018-01-17 RX ADMIN — DOCUSATE SODIUM 100 MG: 100 CAPSULE, LIQUID FILLED ORAL at 08:26

## 2018-01-17 RX ADMIN — IBUPROFEN 600 MG: 600 TABLET ORAL at 01:00

## 2018-01-17 NOTE — DISCHARGE INSTRUCTIONS
Vaginal Delivery   WHAT YOU SHOULD KNOW:   A vaginal delivery is the birth of your baby through your vagina (birth canal)  AFTER YOU LEAVE:   Medicines:  · NSAIDs  help decrease swelling and pain or fever  This medicine is available with or without a doctor's order  NSAIDs can cause stomach bleeding or kidney problems in certain people  If you take blood thinner medicine, always ask your healthcare provider if NSAIDs are safe for you  Always read the medicine label and follow directions  · Take your medicine as directed  Call your healthcare provider if you think your medicine is not helping or if you have side effects  Tell him if you are allergic to any medicine  Keep a list of the medicines, vitamins, and herbs you take  Include the amounts, and when and why you take them  Bring the list or the pill bottles to follow-up visits  Carry your medicine list with you in case of an emergency  Follow up with your primary healthcare provider:  Most women need to return 6 weeks after a vaginal delivery  Ask about how to care for your wounds or stitches  Write down your questions so you remember to ask them during your visits  Activity:  Rest as much as possible  Try to keep all activities short  You may be able to do some exercise soon after you have your baby  Talk with your primary healthcare provider before you start exercising  If you work outside the home, ask when you can return to your job  Kegel exercises:  Kegel exercises may help your vaginal and rectal muscles heal faster  You can do Kegel exercises by tightening and relaxing the muscles around your vagina  Kegel exercises help make the muscles stronger  Breast care:  When your milk comes in, your breasts may feel full and hard  Ask how to care for your breasts, even if you are not breastfeeding  Constipation:  Do not try to push the bowel movement out if it is too hard   High-fiber foods, extra liquids, and regular exercise can help you prevent constipation  Examples of high-fiber foods are fruit and bran  Prune juice and water are good liquids to drink  Regular exercise helps your digestive system work  You may also be told to take over-the-counter fiber and stool softener medicines  Take these items as directed  Hemorrhoids:  Pregnancy can cause severe hemorrhoids  You may have rectal pain because of the hemorrhoids  Ask how to prevent or treat hemorrhoids  Perineum care: Your perineum is the area between your vagina and anus  Keep the area clean and dry to help it heal and to prevent infection  Wash the area gently with soap and water when you bathe or shower  Rinse your perineum with warm water when you use the toilet  Your primary healthcare provider may suggest you use a warm sitz bath to help decrease pain  A sitz bath is a bathtub or basin filled to hip level  Stay in the sitz bath for 20 to 30 minutes, or as directed  Vaginal discharge: You will have vaginal discharge, called lochia, after your delivery  The lochia is bright red the first day or two after the birth  By the fourth day, the amount decreases, and it turns red-brown  Use a sanitary pad rather than a tampon to prevent a vaginal infection  It is normal to have lochia up to 8 weeks after your baby is born  Monthly periods: Your period may start again within 7 to 12 weeks after your baby is born  If you are breastfeeding, it may take longer for your period to start again  You can still get pregnant again even though you do not have your monthly period  Talk with your primary healthcare provider about a birth control method that will be good for you if you do not want to get pregnant  Mood changes: Many new mothers have some kind of mood changes after delivery  Some of these changes occur because of lack of sleep, hormone changes, and caring for a new baby  Some mood changes can be more serious, such as postpartum depression   Talk with your primary healthcare provider if you feel unable to care for yourself or your baby  Sexual activity:  You may need to avoid sex for 6 to 7 weeks after you have your baby  You may notice you have a decreased desire for sex, or sex may be painful  You may need to use a vaginal lubricant (gel) to help make sex more comfortable  Contact your primary healthcare provider if:   · You have heavy vaginal bleeding that fills 1 or more sanitary pads in 1 hour  · You have a fever  · Your pain does not go away, or gets worse  · The skin between your vagina and rectum is swollen, warm, or red  · You have swollen, hard, or painful breasts  · You feel very sad or depressed  · You feel more tired than usual      · You have questions or concerns about your condition or care  Seek care immediately or call 911 if:   · You have pus or yellow drainage coming from your vagina or wound  · You are urinating very little, or not at all  · Your arm or leg feels warm, tender, and painful  It may look swollen and red  · You feel lightheaded, have sudden and worsening chest pain, or trouble breathing  You may have more pain when you take deep breaths or cough, or you may cough up blood  © 2014 [address] is for End User's use only and may not be sold, redistributed or otherwise used for commercial purposes  All illustrations and images included in CareNotes® are the copyrighted property of [employer]  or [employer]  The above information is an  only  It is not intended as medical advice for individual conditions or treatments  Talk to your doctor, nurse or pharmacist before following any medical regimen to see if it is safe and effective for you

## 2018-01-20 ENCOUNTER — OB ABSTRACT (OUTPATIENT)
Dept: OBGYN CLINIC | Facility: CLINIC | Age: 33
End: 2018-01-20

## 2018-01-22 VITALS
BODY MASS INDEX: 27.82 KG/M2 | HEIGHT: 63 IN | WEIGHT: 157 LBS | SYSTOLIC BLOOD PRESSURE: 118 MMHG | DIASTOLIC BLOOD PRESSURE: 62 MMHG

## 2018-01-22 VITALS
SYSTOLIC BLOOD PRESSURE: 108 MMHG | BODY MASS INDEX: 28.53 KG/M2 | HEIGHT: 63 IN | DIASTOLIC BLOOD PRESSURE: 60 MMHG | WEIGHT: 161 LBS

## 2018-01-22 VITALS
SYSTOLIC BLOOD PRESSURE: 116 MMHG | WEIGHT: 162 LBS | BODY MASS INDEX: 28.7 KG/M2 | DIASTOLIC BLOOD PRESSURE: 58 MMHG | HEIGHT: 63 IN

## 2018-01-22 VITALS — SYSTOLIC BLOOD PRESSURE: 100 MMHG | WEIGHT: 141 LBS | BODY MASS INDEX: 24.98 KG/M2 | DIASTOLIC BLOOD PRESSURE: 62 MMHG

## 2018-01-22 VITALS — WEIGHT: 132 LBS | DIASTOLIC BLOOD PRESSURE: 62 MMHG | BODY MASS INDEX: 23.38 KG/M2 | SYSTOLIC BLOOD PRESSURE: 100 MMHG

## 2018-01-22 VITALS
DIASTOLIC BLOOD PRESSURE: 62 MMHG | HEIGHT: 63 IN | SYSTOLIC BLOOD PRESSURE: 98 MMHG | WEIGHT: 149 LBS | BODY MASS INDEX: 26.4 KG/M2

## 2018-01-24 ENCOUNTER — TELEPHONE (OUTPATIENT)
Dept: OBGYN CLINIC | Facility: CLINIC | Age: 33
End: 2018-01-24

## 2018-01-24 VITALS — SYSTOLIC BLOOD PRESSURE: 110 MMHG | BODY MASS INDEX: 28.7 KG/M2 | WEIGHT: 162 LBS | DIASTOLIC BLOOD PRESSURE: 70 MMHG

## 2018-01-24 VITALS
WEIGHT: 169 LBS | DIASTOLIC BLOOD PRESSURE: 66 MMHG | HEIGHT: 63 IN | SYSTOLIC BLOOD PRESSURE: 116 MMHG | BODY MASS INDEX: 29.95 KG/M2

## 2018-01-24 VITALS — BODY MASS INDEX: 29.76 KG/M2 | WEIGHT: 168 LBS | SYSTOLIC BLOOD PRESSURE: 112 MMHG | DIASTOLIC BLOOD PRESSURE: 74 MMHG

## 2018-01-24 LAB — PLACENTA IN STORAGE: NORMAL

## 2018-02-15 ENCOUNTER — OB ABSTRACT (OUTPATIENT)
Dept: OBGYN CLINIC | Facility: CLINIC | Age: 33
End: 2018-02-15

## 2018-02-16 ENCOUNTER — POSTPARTUM VISIT (OUTPATIENT)
Dept: OBGYN CLINIC | Facility: CLINIC | Age: 33
End: 2018-02-16

## 2018-02-16 VITALS — WEIGHT: 148 LBS | BODY MASS INDEX: 26.22 KG/M2 | DIASTOLIC BLOOD PRESSURE: 66 MMHG | SYSTOLIC BLOOD PRESSURE: 98 MMHG

## 2018-02-16 DIAGNOSIS — O34.219 VBAC, DELIVERED: ICD-10-CM

## 2018-02-16 PROBLEM — Z3A.39 39 WEEKS GESTATION OF PREGNANCY: Status: RESOLVED | Noted: 2018-01-15 | Resolved: 2018-02-16

## 2018-02-16 PROBLEM — Z34.93 ENCOUNTER FOR PREGNANCY RELATED EXAMINATION IN THIRD TRIMESTER: Status: RESOLVED | Noted: 2018-01-15 | Resolved: 2018-02-16

## 2018-02-16 PROCEDURE — 99024 POSTOP FOLLOW-UP VISIT: CPT | Performed by: OBSTETRICS & GYNECOLOGY

## 2018-02-16 NOTE — PROGRESS NOTES
Assessment/Plan:    Normal postpartum check  Released for normal activity  Return to office for annual exam when due       Problem List Items Addressed This Visit     , delivered            Subjective:      Patient ID: Vasile Esteban is a 28 y o  female  Siena Ortiz is here today for her postpartum check  She had a successful  without complications and recovering well  She is breast-feeding without complications  Her bowels and bladder have returned to normal lochia is minimal to none  She plans to use natural family planning for contraception as she has in the past   She plans to return to work in a few weeks  The following portions of the patient's history were reviewed and updated as appropriate:   She  has a past medical history of Anemia; Seasonal allergies; Spontaneous ; Threatened ; Varicella; and Visual impairment  She  does not have any pertinent problems on file  She  has a past surgical history that includes  section (); Knee cartilage surgery (Right); and Knee arthroscopy w/ meniscal repair ()  Her family history includes Arthritis in her mother; Asthma in her mother; Birth defects in her daughter; Depression in her father; Diabetes in her father and paternal grandfather; Early death in her paternal grandmother; Glycogen storage disease in her other; Heart attack in her paternal grandmother; Heart disease in her father and paternal grandmother; Hyperlipidemia in her father, paternal grandfather, and paternal grandmother; Hypertension in her father, paternal grandfather, and paternal grandmother; Learning disabilities in her sister; Miscarriages / [place] in her maternal aunt and mother; Other in her daughter; Thyroid disease in her father  She  reports that she has never smoked  She has never used smokeless tobacco  She reports that she does not drink alcohol or use drugs  No current outpatient prescriptions on file       No current facility-administered medications for this visit  Current Outpatient Prescriptions on File Prior to Visit   Medication Sig    [DISCONTINUED] acetaminophen (TYLENOL) 325 mg tablet 1 tab every 4-6hr as needed for pain    [DISCONTINUED] benzocaine-menthol-lanolin-aloe (DERMOPLAST) 20-0 5 % topical spray Apply 1 application topically 4 (four) times a day as needed for mild pain    [DISCONTINUED] docusate sodium (COLACE) 100 mg capsule Take 1 capsule by mouth 2 (two) times a day    [DISCONTINUED] ferrous sulfate 325 (65 Fe) mg tablet Take 325 mg by mouth every other day    [DISCONTINUED] hydrocortisone 1 % cream Apply 1 application topically 4 (four) times a day as needed for irritation or rash    [DISCONTINUED] ibuprofen (MOTRIN) 600 mg tablet Take 1 tablet by mouth every 6 (six) hours as needed for mild pain    [DISCONTINUED] Prenat w/o W-TY-Szulnzz-FA-DHA (PNV-DHA) 27-0 6-0 4-300 MG CAPS Take 1 tablet by mouth daily     No current facility-administered medications on file prior to visit  She is allergic to kiwi extract       Review of Systems   Constitutional: Negative for fatigue, fever and unexpected weight change  HENT: Negative for dental problem, mouth sores, nosebleeds, rhinorrhea, sinus pain, sinus pressure and sore throat  Eyes: Negative for pain, discharge and visual disturbance  Respiratory: Negative for cough, chest tightness, shortness of breath and wheezing  Cardiovascular: Negative for chest pain, palpitations and leg swelling  Gastrointestinal: Negative for blood in stool, constipation, diarrhea, nausea and vomiting  Endocrine: Negative for polydipsia  Genitourinary: Negative for difficulty urinating, dyspareunia, dysuria, menstrual problem, pelvic pain, urgency, vaginal discharge and vaginal pain  Musculoskeletal: Negative for arthralgias, back pain and joint swelling  Allergic/Immunologic: Negative for environmental allergies     Neurological: Negative for seizures, light-headedness and headaches  Hematological: Does not bruise/bleed easily  Psychiatric/Behavioral: Negative for sleep disturbance  The patient is not nervous/anxious  Objective:      LMP 04/16/2017          Physical Exam   Constitutional: She is oriented to person, place, and time  She appears well-developed and well-nourished  Rennis Silence white female no apparent distress   Abdominal: Soft  She exhibits no distension and no mass  There is no tenderness  There is no rebound and no guarding  Genitourinary:   Genitourinary Comments: Perineum is intact and well healed, cervix is closed, uterus is well involuted, mobile, and nontender, and there are no adnexal masses or tenderness noted on exam   Neurological: She is alert and oriented to person, place, and time  Psychiatric:   Depression score is negative at postpartum depression scale equal 2   Vitals reviewed

## 2019-03-01 ENCOUNTER — ANNUAL EXAM (OUTPATIENT)
Dept: OBGYN CLINIC | Facility: CLINIC | Age: 34
End: 2019-03-01
Payer: COMMERCIAL

## 2019-03-01 VITALS
DIASTOLIC BLOOD PRESSURE: 60 MMHG | SYSTOLIC BLOOD PRESSURE: 100 MMHG | HEIGHT: 63 IN | BODY MASS INDEX: 25.34 KG/M2 | WEIGHT: 143 LBS

## 2019-03-01 DIAGNOSIS — Z01.419 ENCNTR FOR GYN EXAM (GENERAL) (ROUTINE) W/O ABN FINDINGS: Primary | ICD-10-CM

## 2019-03-01 PROBLEM — O34.219 VBAC, DELIVERED: Status: RESOLVED | Noted: 2018-01-16 | Resolved: 2019-03-01

## 2019-03-01 PROCEDURE — S0612 ANNUAL GYNECOLOGICAL EXAMINA: HCPCS | Performed by: NURSE PRACTITIONER

## 2019-03-01 NOTE — PROGRESS NOTES
Assessment/Plan   Diagnoses and all orders for this visit:    Encntr for gyn exam (general) (routine) w/o abn findings        Discussion    Reviewed with patient normal exam today  Pap deferred today  Normal breast exam today  Monthly SBEs advised  Vitamin D and Calcim Supplements advised  Exercise most days of the week  Follow with PCP for regular check-ups and blood work  RTO 1 year for annual or sooner as needed  Subjective     Comfort Juárez is a 35 y o  female who presents for annual well woman exam    Last exam 17 Pap Normal HPV negative  Pap guidelines reviewed with patient  Pap deferred today  Pt denies any abnormal vaginal discharge, itching, or odor  Pt in a mutually exclusive relationship () with a male partner and denies the need for STD testing today  Menstrual Cycle:  LMP: 19  Has had 3 menses, irregular at this point  Still breastfeeding  OB History     G 4 P 3013   Obstetric Comments   :  PRIMARY LTCS F 9LBS 4OZ - 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   VENTRICULOMEGALY;  -  F 7LBS 12OZ;  SAB NATURAL 5 Wk; (2014 -   CF NEG) 18-  F 8lb 4 8oz    Contraception: Natural Family Planning  Practices monthly SBEs, no breast complaints today  Denies any bowel or bladder issues  Pt follows with PCP for regular check-ups and blood work  Review of Systems   All other systems reviewed and are negative        The following portions of the patient's history were reviewed and updated as appropriate: allergies, current medications, past family history, past medical history, past social history, past surgical history and problem list       Past Medical History:   Diagnosis Date    Anemia     with pregnancy on iron     Seasonal allergies     Spontaneous      RESOLVED: [id number]    Threatened      LAST ASSESSED: [id number]    Varicella     had as child and received vaccine    Visual impairment     eyewear        Past Surgical History:   Procedure Laterality Date     SECTION  2013    LOW TRANSVERSE    KNEE ARTHROSCOPY W/ MENISCAL REPAIR  2003    MEDIAL    KNEE CARTILAGE SURGERY Right     2003       Family History   Problem Relation Age of Onset    Diabetes Father     Depression Father     Heart disease Father         CARDIAC DISORDER    Hyperlipidemia Father     Hypertension Father     Thyroid disease Father     Birth defects Daughter         AGENESIS, CORPUS CALLOSUM    Other Daughter         VENTRICULOMEGALY OF BRAIN, CONGENITAL     Arthritis Mother     Asthma Mother    Merdis Valentín / [place] Mother     Learning disabilities Sister     Miscarriages / Stillbirths Maternal Aunt     Heart disease Paternal Grandmother     Early death Paternal Grandmother     Heart attack Paternal Grandmother     Hyperlipidemia Paternal Grandmother     Hypertension Paternal Grandmother     Diabetes Paternal Grandfather     Hyperlipidemia Paternal Grandfather     Hypertension Paternal Grandfather     Glycogen storage disease Other         UNSPECIFIEID TYPE       Social History     Socioeconomic History    Marital status: /Civil Union     Spouse name: Not on file    Number of children: Not on file    Years of education: Not on file    Highest education level: Not on file   Occupational History    Not on file   Social Needs    Financial resource strain: Not on file    Food insecurity:     Worry: Not on file     Inability: Not on file    Transportation needs:     Medical: Not on file     Non-medical: Not on file   Tobacco Use    Smoking status: Never Smoker    Smokeless tobacco: Never Used   Substance and Sexual Activity    Alcohol use: No    Drug use: No    Sexual activity: Yes     Partners: Male     Birth control/protection: None   Lifestyle    Physical activity:     Days per week: Not on file     Minutes per session: Not on file    Stress: Not on file   Relationships    Social connections:     Talks on phone: Not on file     Gets together: Not on file     Attends [employer] service: Not on file     Active member of club or organization: Not on file     Attends meetings of clubs or organizations: Not on file     Relationship status: Not on file    Intimate partner violence:     Fear of current or ex partner: Not on file     Emotionally abused: Not on file     Physically abused: Not on file     Forced sexual activity: Not on file   Other Topics Concern    Not on file   Social History Narrative    DENIED: HISTORY OF DOMESTIC VIOLENCE       No current outpatient medications on file  Allergies   Allergen Reactions    Kiwi Extract Rash and Swelling     Mouth irritation  Objective   Vitals:    03/01/19 1106   BP: 100/60   Weight: 64 9 kg (143 lb)   Height: 5' 3" (1 6 m)     Physical Exam   Constitutional: She is oriented to person, place, and time  She appears well-developed and well-nourished  HENT:   Head: Normocephalic  Neck: Normal range of motion  Neck supple  No tracheal deviation present  No thyromegaly present  Cardiovascular: Normal rate, regular rhythm and normal heart sounds  Pulmonary/Chest: Effort normal and breath sounds normal  Right breast exhibits no inverted nipple, no mass, no nipple discharge, no skin change and no tenderness  Left breast exhibits no inverted nipple, no mass, no nipple discharge, no skin change and no tenderness  No breast tenderness, discharge or bleeding  Breasts are symmetrical    Abdominal: Soft  Bowel sounds are normal  She exhibits no distension and no mass  There is no tenderness  There is no rebound and no guarding  Genitourinary: Vagina normal and uterus normal  No breast tenderness, discharge or bleeding  No labial fusion  There is no rash, tenderness, lesion or injury on the right labia  There is no rash, tenderness, lesion or injury on the left labia  Cervix exhibits no motion tenderness, no discharge and no friability   Right adnexum displays no mass, no tenderness and no fullness  Left adnexum displays no mass, no tenderness and no fullness  Musculoskeletal: Normal range of motion  Neurological: She is alert and oriented to person, place, and time  Skin: Skin is warm and dry  Psychiatric: She has a normal mood and affect   Her behavior is normal  Judgment and thought content normal

## 2019-07-16 ENCOUNTER — TELEPHONE (OUTPATIENT)
Dept: OBGYN CLINIC | Facility: CLINIC | Age: 34
End: 2019-07-16

## 2019-07-16 DIAGNOSIS — Z3A.01 LESS THAN 8 WEEKS GESTATION OF PREGNANCY: Primary | ICD-10-CM

## 2019-07-16 DIAGNOSIS — N91.2 AMENORRHEA: ICD-10-CM

## 2019-08-08 ENCOUNTER — TELEPHONE (OUTPATIENT)
Dept: OBGYN CLINIC | Facility: CLINIC | Age: 34
End: 2019-08-08

## 2019-08-08 NOTE — TELEPHONE ENCOUNTER
Patient expressed how she previous had a genetic anomaly and is unsure why she is being seen at 10w for her first prenatal  She called [facility] for an appointment and they will not see her without a referral from us  She is calling to discuss if she can get her appointment moved up as she does not want to wait that late to get everything started

## 2019-08-12 ENCOUNTER — APPOINTMENT (OUTPATIENT)
Dept: LAB | Facility: CLINIC | Age: 34
End: 2019-08-12
Payer: COMMERCIAL

## 2019-08-12 DIAGNOSIS — Z3A.01 LESS THAN 8 WEEKS GESTATION OF PREGNANCY: ICD-10-CM

## 2019-08-12 LAB
B-HCG SERPL-ACNC: ABNORMAL MIU/ML
PROGEST SERPL-MCNC: 12.6 NG/ML

## 2019-08-12 PROCEDURE — 36415 COLL VENOUS BLD VENIPUNCTURE: CPT

## 2019-08-12 PROCEDURE — 84144 ASSAY OF PROGESTERONE: CPT

## 2019-08-12 PROCEDURE — 84702 CHORIONIC GONADOTROPIN TEST: CPT

## 2019-08-14 ENCOUNTER — INITIAL PRENATAL (OUTPATIENT)
Dept: OBGYN CLINIC | Facility: CLINIC | Age: 34
End: 2019-08-14

## 2019-08-14 VITALS — DIASTOLIC BLOOD PRESSURE: 58 MMHG | BODY MASS INDEX: 25.69 KG/M2 | WEIGHT: 145 LBS | SYSTOLIC BLOOD PRESSURE: 90 MMHG

## 2019-08-14 DIAGNOSIS — Z34.81 PRENATAL CARE, SUBSEQUENT PREGNANCY, FIRST TRIMESTER: Primary | ICD-10-CM

## 2019-08-14 PROCEDURE — PNV: Performed by: NURSE PRACTITIONER

## 2019-08-15 NOTE — PROGRESS NOTES
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, transient tachypnea of , healthy today  2/23/15  39 weeks F No complications    39 weeks F Jaunice requiring bili blanket  2016  Follows closely in each pregnancy with [facility]FM due to hx of first daughter with agenesis of corpus collosum  TVUS: [place] IUP at 8w0d based on CRL 1 53cm (+) FHR 168bpm S=D  Has had some mild nausea and vomititng  Coping with small frequent meals, ginger ale, manageable at this time  Fatigue and breast tenderness  Denies any HA, Cramping, VB, LOF, Edema, Domestic Violence, Smoking  No FM yet  Tolerating PNV  Forms signed, sequential screen reviewed  Pap deferred  Cultures done  Referral for MFM given  Rx for initial prenatal labs given including early Glucola and TFTs due to family hx  RTO 4 weeks or sooner as needed

## 2019-08-16 LAB
DEPRECATED C TRACH RRNA XXX QL PRB: NOT DETECTED
N GONORRHOEA DNA UR QL NAA+PROBE: NOT DETECTED

## 2019-08-17 LAB — SL AMB GENITAL CULTURE: ABNORMAL

## 2019-09-13 ENCOUNTER — ROUTINE PRENATAL (OUTPATIENT)
Dept: OBGYN CLINIC | Facility: CLINIC | Age: 34
End: 2019-09-13

## 2019-09-13 VITALS — DIASTOLIC BLOOD PRESSURE: 58 MMHG | WEIGHT: 152.6 LBS | BODY MASS INDEX: 27.03 KG/M2 | SYSTOLIC BLOOD PRESSURE: 102 MMHG

## 2019-09-13 DIAGNOSIS — N89.8 VAGINAL ITCHING: Primary | ICD-10-CM

## 2019-09-13 PROCEDURE — PNV: Performed by: NURSE PRACTITIONER

## 2019-09-13 NOTE — PROGRESS NOTES
PROBLEM VISIT:  Pt complains of vaginal itching, thin clear discharge, irritation  Denies any odor  Denies any new products vaginally  Denies any need for STD testing  Symptoms began last week  She did a 3 day OTC Monistat treatment on Monday finished on Wednesday and symptoms remained  Denies any urinary frequency, urgency, or hesitancy  Denies any dysuria  Urine dip in office negative  Speculum exam performed thin clear discharge noted in vaginal area  Wet mount yeast, clue cells and trich absent  Genital culture sent, will call with results and follow up accordingly  Advised Hydrocortisone Cream PRN as needed for vaginal irritation  Loose cotton clothing, no scented products vaginally  Denies any N/V, HA, Cramping, VB, LOF, Edema, Domestic Violence, Smoking  No FM yet  Tolerating PNV  Has appt with MFM next week  RTO regular ob visit

## 2019-09-17 LAB — SL AMB GENITAL CULTURE: ABNORMAL

## 2019-09-18 ENCOUNTER — APPOINTMENT (OUTPATIENT)
Dept: LAB | Facility: CLINIC | Age: 34
End: 2019-09-18
Payer: COMMERCIAL

## 2019-09-18 DIAGNOSIS — Z34.81 PRENATAL CARE, SUBSEQUENT PREGNANCY, FIRST TRIMESTER: ICD-10-CM

## 2019-09-18 LAB
ABO GROUP BLD: NORMAL
AMORPH URATE CRY URNS QL MICRO: ABNORMAL /HPF
BACTERIA UR QL AUTO: ABNORMAL /HPF
BASOPHILS # BLD AUTO: 0.03 THOUSANDS/ΜL (ref 0–0.1)
BASOPHILS NFR BLD AUTO: 0 % (ref 0–1)
BILIRUB UR QL STRIP: NEGATIVE
BLD GP AB SCN SERPL QL: NEGATIVE
CLARITY UR: CLEAR
COLOR UR: ABNORMAL
EOSINOPHIL # BLD AUTO: 0.03 THOUSAND/ΜL (ref 0–0.61)
EOSINOPHIL NFR BLD AUTO: 0 % (ref 0–6)
ERYTHROCYTE [DISTWIDTH] IN BLOOD BY AUTOMATED COUNT: 13.2 % (ref 11.6–15.1)
GLUCOSE 1H P 50 G GLC PO SERPL-MCNC: 101 MG/DL
GLUCOSE UR STRIP-MCNC: NEGATIVE MG/DL
HBV SURFACE AG SER QL: NORMAL
HCT VFR BLD AUTO: 39.5 % (ref 34.8–46.1)
HCV AB SER QL: NORMAL
HGB BLD-MCNC: 13.4 G/DL (ref 11.5–15.4)
HGB UR QL STRIP.AUTO: NEGATIVE
IMM GRANULOCYTES # BLD AUTO: 0.03 THOUSAND/UL (ref 0–0.2)
IMM GRANULOCYTES NFR BLD AUTO: 0 % (ref 0–2)
KETONES UR STRIP-MCNC: NEGATIVE MG/DL
LEUKOCYTE ESTERASE UR QL STRIP: ABNORMAL
LYMPHOCYTES # BLD AUTO: 1.13 THOUSANDS/ΜL (ref 0.6–4.47)
LYMPHOCYTES NFR BLD AUTO: 13 % (ref 14–44)
MCH RBC QN AUTO: 31.4 PG (ref 26.8–34.3)
MCHC RBC AUTO-ENTMCNC: 33.9 G/DL (ref 31.4–37.4)
MCV RBC AUTO: 93 FL (ref 82–98)
MONOCYTES # BLD AUTO: 0.43 THOUSAND/ΜL (ref 0.17–1.22)
MONOCYTES NFR BLD AUTO: 5 % (ref 4–12)
NEUTROPHILS # BLD AUTO: 7.04 THOUSANDS/ΜL (ref 1.85–7.62)
NEUTS SEG NFR BLD AUTO: 82 % (ref 43–75)
NITRITE UR QL STRIP: NEGATIVE
NON-SQ EPI CELLS URNS QL MICRO: ABNORMAL /HPF
NRBC BLD AUTO-RTO: 0 /100 WBCS
PH UR STRIP.AUTO: 6.5 [PH]
PLATELET # BLD AUTO: 189 THOUSANDS/UL (ref 149–390)
PMV BLD AUTO: 9.3 FL (ref 8.9–12.7)
PROT UR STRIP-MCNC: NEGATIVE MG/DL
RBC # BLD AUTO: 4.27 MILLION/UL (ref 3.81–5.12)
RBC #/AREA URNS AUTO: ABNORMAL /HPF
RH BLD: POSITIVE
RUBV IGG SERPL IA-ACNC: >175 IU/ML
SP GR UR STRIP.AUTO: 1.01 (ref 1–1.03)
SPECIMEN EXPIRATION DATE: NORMAL
T4 FREE SERPL-MCNC: 1.09 NG/DL (ref 0.76–1.46)
TSH SERPL DL<=0.05 MIU/L-ACNC: 0.88 UIU/ML (ref 0.36–3.74)
UROBILINOGEN UR QL STRIP.AUTO: 0.2 E.U./DL
WBC # BLD AUTO: 8.69 THOUSAND/UL (ref 4.31–10.16)
WBC #/AREA URNS AUTO: ABNORMAL /HPF

## 2019-09-18 PROCEDURE — 36415 COLL VENOUS BLD VENIPUNCTURE: CPT | Performed by: NURSE PRACTITIONER

## 2019-09-18 PROCEDURE — 81001 URINALYSIS AUTO W/SCOPE: CPT

## 2019-09-18 PROCEDURE — 87086 URINE CULTURE/COLONY COUNT: CPT

## 2019-09-18 PROCEDURE — 84439 ASSAY OF FREE THYROXINE: CPT | Performed by: NURSE PRACTITIONER

## 2019-09-18 PROCEDURE — 82950 GLUCOSE TEST: CPT | Performed by: NURSE PRACTITIONER

## 2019-09-18 PROCEDURE — 80081 OBSTETRIC PANEL INC HIV TSTG: CPT | Performed by: NURSE PRACTITIONER

## 2019-09-18 PROCEDURE — 86803 HEPATITIS C AB TEST: CPT | Performed by: NURSE PRACTITIONER

## 2019-09-18 PROCEDURE — 84443 ASSAY THYROID STIM HORMONE: CPT | Performed by: NURSE PRACTITIONER

## 2019-09-19 LAB
BACTERIA UR CULT: NORMAL
RPR SER QL: NORMAL

## 2019-09-20 LAB — HIV 1+2 AB+HIV1 P24 AG SERPL QL IA: NORMAL

## 2019-10-03 ENCOUNTER — ROUTINE PRENATAL (OUTPATIENT)
Dept: OBGYN CLINIC | Facility: CLINIC | Age: 34
End: 2019-10-03

## 2019-10-03 VITALS — WEIGHT: 155 LBS | BODY MASS INDEX: 27.46 KG/M2 | DIASTOLIC BLOOD PRESSURE: 68 MMHG | SYSTOLIC BLOOD PRESSURE: 106 MMHG

## 2019-10-03 DIAGNOSIS — J01.90 ACUTE NON-RECURRENT SINUSITIS, UNSPECIFIED LOCATION: ICD-10-CM

## 2019-10-03 DIAGNOSIS — Z34.82 ENCOUNTER FOR SUPERVISION OF NORMAL PREGNANCY IN MULTIGRAVIDA IN SECOND TRIMESTER: Primary | ICD-10-CM

## 2019-10-03 PROCEDURE — PNV: Performed by: PHYSICIAN ASSISTANT

## 2019-10-03 RX ORDER — ASPIRIN 81 MG/1
81 TABLET ORAL DAILY
COMMUNITY
End: 2020-01-29 | Stop reason: ALTCHOICE

## 2019-10-03 RX ORDER — AZITHROMYCIN 250 MG/1
TABLET, FILM COATED ORAL
Qty: 6 TABLET | Refills: 0 | Status: SHIPPED | OUTPATIENT
Start: 2019-10-03 | End: 2019-10-07

## 2019-10-03 NOTE — PROGRESS NOTES
VISIT: Denies n/v/HA/cramping/vb/lof/edema/dv/smoking; in office urine dip negative protein/glucose  URI symptoms for the past 2 weeks - thought she was getting better but past couple days increase in sinus pressure - leaves for [employer] tomorrow - normally would just ride out but since getting on a plane/trip requesting antibiotic at this time  Also tried pseudofed since in second trimester now and didn't help  Dr Heydi Maciel recommended LD-ASA for the couple days before the plane ride for DVT prophylaxis - sequential screen done and WNL; Level 2 is scheduled  PNVs + DHA - tolerating daily  No FM yet  Flu - will plan to get done once sinus infection resolves and returns from trip ; Rx: Zpack - 500 mg po x 1 on day 1, then 250 mg po qd x 4 days;  Encourage patient to stay hydrated and reviewed DVT prophylaxis for her plane ride  RTO in 4 weeks for routine ob check or sooner if needed

## 2019-11-12 ENCOUNTER — ROUTINE PRENATAL (OUTPATIENT)
Dept: OBGYN CLINIC | Facility: CLINIC | Age: 34
End: 2019-11-12

## 2019-11-12 VITALS — WEIGHT: 163 LBS | SYSTOLIC BLOOD PRESSURE: 98 MMHG | BODY MASS INDEX: 28.87 KG/M2 | DIASTOLIC BLOOD PRESSURE: 58 MMHG

## 2019-11-12 DIAGNOSIS — Z34.82 PRENATAL CARE, SUBSEQUENT PREGNANCY, SECOND TRIMESTER: Primary | ICD-10-CM

## 2019-11-12 PROCEDURE — PNV: Performed by: NURSE PRACTITIONER

## 2019-11-12 NOTE — PROGRESS NOTES
OFFICE VISIT: Denies any N/V, HA, Cramping, VB, LOF, Edema, Domestic Violence, Smoking  + FM  Tolerating PNV  It's a girl! Feeling well  RTO 4 weeks or sooner as needed

## 2019-12-13 ENCOUNTER — ROUTINE PRENATAL (OUTPATIENT)
Dept: OBGYN CLINIC | Facility: CLINIC | Age: 34
End: 2019-12-13

## 2019-12-13 VITALS
SYSTOLIC BLOOD PRESSURE: 124 MMHG | WEIGHT: 172.6 LBS | HEIGHT: 63 IN | DIASTOLIC BLOOD PRESSURE: 76 MMHG | BODY MASS INDEX: 30.58 KG/M2

## 2019-12-13 DIAGNOSIS — Z3A.25 PREGNANCY WITH 25 COMPLETED WEEKS GESTATION: Primary | ICD-10-CM

## 2019-12-13 PROCEDURE — PNV: Performed by: OBSTETRICS & GYNECOLOGY

## 2019-12-13 NOTE — PROGRESS NOTES
Patient reports good fm, no n/v, headache, cramping, bleeding, loss of fluid, edema, dom violence, or smoking  tami pnv urine neg/neg  Reviewed desire tami, two prior successful  return in 4 weeks or sooner as needed  Given slip glucola cbc

## 2020-01-04 ENCOUNTER — APPOINTMENT (OUTPATIENT)
Dept: LAB | Facility: CLINIC | Age: 35
End: 2020-01-04
Payer: COMMERCIAL

## 2020-01-04 DIAGNOSIS — Z3A.25 PREGNANCY WITH 25 COMPLETED WEEKS GESTATION: ICD-10-CM

## 2020-01-04 LAB
BASOPHILS # BLD AUTO: 0.04 THOUSANDS/ΜL (ref 0–0.1)
BASOPHILS NFR BLD AUTO: 1 % (ref 0–1)
EOSINOPHIL # BLD AUTO: 0.03 THOUSAND/ΜL (ref 0–0.61)
EOSINOPHIL NFR BLD AUTO: 0 % (ref 0–6)
ERYTHROCYTE [DISTWIDTH] IN BLOOD BY AUTOMATED COUNT: 13.2 % (ref 11.6–15.1)
GLUCOSE 1H P 50 G GLC PO SERPL-MCNC: 159 MG/DL
HCT VFR BLD AUTO: 34.2 % (ref 34.8–46.1)
HGB BLD-MCNC: 11 G/DL (ref 11.5–15.4)
IMM GRANULOCYTES # BLD AUTO: 0.13 THOUSAND/UL (ref 0–0.2)
IMM GRANULOCYTES NFR BLD AUTO: 2 % (ref 0–2)
LYMPHOCYTES # BLD AUTO: 1.13 THOUSANDS/ΜL (ref 0.6–4.47)
LYMPHOCYTES NFR BLD AUTO: 14 % (ref 14–44)
MCH RBC QN AUTO: 30.1 PG (ref 26.8–34.3)
MCHC RBC AUTO-ENTMCNC: 32.2 G/DL (ref 31.4–37.4)
MCV RBC AUTO: 94 FL (ref 82–98)
MONOCYTES # BLD AUTO: 0.59 THOUSAND/ΜL (ref 0.17–1.22)
MONOCYTES NFR BLD AUTO: 7 % (ref 4–12)
NEUTROPHILS # BLD AUTO: 6.45 THOUSANDS/ΜL (ref 1.85–7.62)
NEUTS SEG NFR BLD AUTO: 76 % (ref 43–75)
NRBC BLD AUTO-RTO: 0 /100 WBCS
PLATELET # BLD AUTO: 163 THOUSANDS/UL (ref 149–390)
PMV BLD AUTO: 8.7 FL (ref 8.9–12.7)
RBC # BLD AUTO: 3.65 MILLION/UL (ref 3.81–5.12)
WBC # BLD AUTO: 8.37 THOUSAND/UL (ref 4.31–10.16)

## 2020-01-04 PROCEDURE — 85025 COMPLETE CBC W/AUTO DIFF WBC: CPT

## 2020-01-04 PROCEDURE — 36415 COLL VENOUS BLD VENIPUNCTURE: CPT

## 2020-01-04 PROCEDURE — 82950 GLUCOSE TEST: CPT

## 2020-01-06 ENCOUNTER — TELEPHONE (OUTPATIENT)
Dept: OBGYN CLINIC | Facility: CLINIC | Age: 35
End: 2020-01-06

## 2020-01-06 DIAGNOSIS — O99.810 IMPAIRED GLUCOSE TOLERANCE IN PREGNANCY: Primary | ICD-10-CM

## 2020-01-06 NOTE — TELEPHONE ENCOUNTER
Patient called about lab results , failed her 1 hr Gtt needs 3 hr GTT CBC WNL , discussed with Obdulio Llamas MM CMA

## 2020-01-08 ENCOUNTER — APPOINTMENT (OUTPATIENT)
Dept: LAB | Facility: CLINIC | Age: 35
End: 2020-01-08
Payer: COMMERCIAL

## 2020-01-08 ENCOUNTER — ROUTINE PRENATAL (OUTPATIENT)
Dept: OBGYN CLINIC | Facility: CLINIC | Age: 35
End: 2020-01-08
Payer: COMMERCIAL

## 2020-01-08 VITALS — DIASTOLIC BLOOD PRESSURE: 68 MMHG | SYSTOLIC BLOOD PRESSURE: 112 MMHG | BODY MASS INDEX: 31.32 KG/M2 | WEIGHT: 176.8 LBS

## 2020-01-08 DIAGNOSIS — Z34.03 ENCOUNTER FOR SUPERVISION OF NORMAL FIRST PREGNANCY IN THIRD TRIMESTER: Primary | ICD-10-CM

## 2020-01-08 DIAGNOSIS — O24.410 DIET CONTROLLED GESTATIONAL DIABETES MELLITUS (GDM) IN THIRD TRIMESTER: ICD-10-CM

## 2020-01-08 DIAGNOSIS — O99.810 IMPAIRED GLUCOSE TOLERANCE IN PREGNANCY: ICD-10-CM

## 2020-01-08 LAB
GLUCOSE 1H P 100 G GLC PO SERPL-MCNC: 190 MG/DL (ref 65–179)
GLUCOSE 2H P 100 G GLC PO SERPL-MCNC: 171 MG/DL (ref 65–154)
GLUCOSE 3H P 100 G GLC PO SERPL-MCNC: 94 MG/DL (ref 65–139)
GLUCOSE P FAST SERPL-MCNC: 94 MG/DL (ref 65–99)

## 2020-01-08 PROCEDURE — 90471 IMMUNIZATION ADMIN: CPT

## 2020-01-08 PROCEDURE — 36415 COLL VENOUS BLD VENIPUNCTURE: CPT

## 2020-01-08 PROCEDURE — 82952 GTT-ADDED SAMPLES: CPT

## 2020-01-08 PROCEDURE — 90715 TDAP VACCINE 7 YRS/> IM: CPT

## 2020-01-08 PROCEDURE — 82951 GLUCOSE TOLERANCE TEST (GTT): CPT

## 2020-01-08 PROCEDURE — PNV: Performed by: NURSE PRACTITIONER

## 2020-01-29 ENCOUNTER — ROUTINE PRENATAL (OUTPATIENT)
Dept: OBGYN CLINIC | Facility: CLINIC | Age: 35
End: 2020-01-29

## 2020-01-29 VITALS
BODY MASS INDEX: 31.54 KG/M2 | DIASTOLIC BLOOD PRESSURE: 66 MMHG | HEIGHT: 63 IN | SYSTOLIC BLOOD PRESSURE: 110 MMHG | WEIGHT: 178 LBS

## 2020-01-29 DIAGNOSIS — Z3A.32 32 WEEKS GESTATION OF PREGNANCY: Primary | ICD-10-CM

## 2020-01-29 PROBLEM — O09.299 HISTORY OF MACROSOMIA IN INFANT IN PRIOR PREGNANCY, CURRENTLY PREGNANT: Status: ACTIVE | Noted: 2017-07-17

## 2020-01-29 PROBLEM — O24.410 DIET CONTROLLED GESTATIONAL DIABETES MELLITUS (GDM): Status: ACTIVE | Noted: 2020-01-17

## 2020-01-29 PROBLEM — O34.219 PREVIOUS CESAREAN DELIVERY, ANTEPARTUM: Status: ACTIVE | Noted: 2017-07-17

## 2020-01-29 PROBLEM — Z82.79: Status: ACTIVE | Noted: 2017-07-17

## 2020-01-29 PROBLEM — O28.1 ABNORMAL BIOCHEMICAL FINDING ON ANTENATAL SCREENING OF MOTHER: Status: ACTIVE | Noted: 2019-11-04

## 2020-01-29 PROCEDURE — PNV: Performed by: OBSTETRICS & GYNECOLOGY

## 2020-01-29 NOTE — PROGRESS NOTES
Visit:  Good FM - following GDM with [facility] - fastings are still slightly high and has f/u with them -

## 2020-01-31 ENCOUNTER — TELEPHONE (OUTPATIENT)
Dept: OBGYN CLINIC | Facility: CLINIC | Age: 35
End: 2020-01-31

## 2020-01-31 NOTE — TELEPHONE ENCOUNTER
Call from [facility] MFM stated they wanted to make us aware patient is to receive Biweekly NST's

## 2020-02-11 ENCOUNTER — ROUTINE PRENATAL (OUTPATIENT)
Dept: OBGYN CLINIC | Facility: CLINIC | Age: 35
End: 2020-02-11
Payer: COMMERCIAL

## 2020-02-11 VITALS — SYSTOLIC BLOOD PRESSURE: 108 MMHG | DIASTOLIC BLOOD PRESSURE: 56 MMHG

## 2020-02-11 DIAGNOSIS — Z34.83 PRENATAL CARE, SUBSEQUENT PREGNANCY, THIRD TRIMESTER: Primary | ICD-10-CM

## 2020-02-11 DIAGNOSIS — O24.414 INSULIN CONTROLLED GESTATIONAL DIABETES MELLITUS (GDM) IN THIRD TRIMESTER: ICD-10-CM

## 2020-02-11 PROCEDURE — 59025 FETAL NON-STRESS TEST: CPT | Performed by: NURSE PRACTITIONER

## 2020-02-11 PROCEDURE — PNV: Performed by: NURSE PRACTITIONER

## 2020-02-11 NOTE — PROGRESS NOTES
OFFICE VISIT: Denies any N/V, HA, Cramping, VB, LOF, Edema, Domestic Violence, Smoking  + FM  Tolerating PNV  Pt was recently started on 5 units of Levemir at night to help with fasting blood sugars  Since starting Levemir blood sugars have been well controlled per patient  Following with [facility]  RTO 1 week for NST and GBS      NST: Reactive reassuring  FHR Baseline: 130's moderate variability  (+) Accelerations  (-) Decelerations    TOCO: Quiet

## 2020-02-18 ENCOUNTER — ROUTINE PRENATAL (OUTPATIENT)
Dept: OBGYN CLINIC | Facility: CLINIC | Age: 35
End: 2020-02-18

## 2020-02-18 VITALS — DIASTOLIC BLOOD PRESSURE: 62 MMHG | WEIGHT: 178.2 LBS | BODY MASS INDEX: 31.57 KG/M2 | SYSTOLIC BLOOD PRESSURE: 112 MMHG

## 2020-02-18 DIAGNOSIS — Z34.83 PRENATAL CARE, SUBSEQUENT PREGNANCY, THIRD TRIMESTER: Primary | ICD-10-CM

## 2020-02-18 PROCEDURE — 59025 FETAL NON-STRESS TEST: CPT | Performed by: NURSE PRACTITIONER

## 2020-02-18 PROCEDURE — PNV: Performed by: NURSE PRACTITIONER

## 2020-02-18 PROCEDURE — 87653 STREP B DNA AMP PROBE: CPT | Performed by: NURSE PRACTITIONER

## 2020-02-18 NOTE — PROGRESS NOTES
OFFICE VISIT: Denies any N/V, HA, Cramping, VB, LOF, Edema, Domestic Violence, Smoking  + FM  Tolerating PNV  GBS done today  RTO 1 week for labor talk  Pt would like to look into induction of labor in her 39th week or pregnancy, will discuss next week with physician  NST: Reactive, reassuring  FHR Baseline: 135   Moderate variability, (+) accelerations, (-) Decelerations    TOCO: Quiet

## 2020-02-20 LAB — GP B STREP DNA SPEC QL NAA+PROBE: ABNORMAL

## 2020-02-26 ENCOUNTER — ROUTINE PRENATAL (OUTPATIENT)
Dept: OBGYN CLINIC | Facility: CLINIC | Age: 35
End: 2020-02-26
Payer: COMMERCIAL

## 2020-02-26 VITALS — WEIGHT: 174.9 LBS | SYSTOLIC BLOOD PRESSURE: 98 MMHG | BODY MASS INDEX: 30.98 KG/M2 | DIASTOLIC BLOOD PRESSURE: 52 MMHG

## 2020-02-26 DIAGNOSIS — O24.414 INSULIN CONTROLLED GESTATIONAL DIABETES MELLITUS (GDM) IN THIRD TRIMESTER: Primary | ICD-10-CM

## 2020-02-26 DIAGNOSIS — Z3A.36 36 WEEKS GESTATION OF PREGNANCY: ICD-10-CM

## 2020-02-26 PROCEDURE — PNV: Performed by: OBSTETRICS & GYNECOLOGY

## 2020-02-26 PROCEDURE — 59025 FETAL NON-STRESS TEST: CPT | Performed by: OBSTETRICS & GYNECOLOGY

## 2020-02-26 NOTE — PROGRESS NOTES
Visit:  Good FM - fighting a viral infection but able to eat and drink - tylenol for any fevers - BS ok - feeling better today - would like IOL at 39 week - march 20 -

## 2020-03-03 ENCOUNTER — ROUTINE PRENATAL (OUTPATIENT)
Dept: OBGYN CLINIC | Facility: CLINIC | Age: 35
End: 2020-03-03
Payer: COMMERCIAL

## 2020-03-03 ENCOUNTER — HOSPITAL ENCOUNTER (OUTPATIENT)
Facility: HOSPITAL | Age: 35
Discharge: HOME | End: 2020-03-03
Attending: OBSTETRICS & GYNECOLOGY | Admitting: OBSTETRICS & GYNECOLOGY
Payer: COMMERCIAL

## 2020-03-03 VITALS
SYSTOLIC BLOOD PRESSURE: 124 MMHG | DIASTOLIC BLOOD PRESSURE: 72 MMHG | WEIGHT: 181.4 LBS | HEART RATE: 110 BPM | BODY MASS INDEX: 32.14 KG/M2 | HEIGHT: 63 IN

## 2020-03-03 VITALS
TEMPERATURE: 97.5 F | DIASTOLIC BLOOD PRESSURE: 59 MMHG | RESPIRATION RATE: 16 BRPM | HEART RATE: 102 BPM | SYSTOLIC BLOOD PRESSURE: 110 MMHG

## 2020-03-03 DIAGNOSIS — O24.414 INSULIN CONTROLLED GESTATIONAL DIABETES MELLITUS (GDM) IN THIRD TRIMESTER: ICD-10-CM

## 2020-03-03 DIAGNOSIS — Z34.83 MULTIGRAVIDA IN THIRD TRIMESTER: Primary | ICD-10-CM

## 2020-03-03 PROCEDURE — 59025 FETAL NON-STRESS TEST: CPT | Performed by: PHYSICIAN ASSISTANT

## 2020-03-03 PROCEDURE — 99214 OFFICE O/P EST MOD 30 MIN: CPT

## 2020-03-03 PROCEDURE — G0463 HOSPITAL OUTPT CLINIC VISIT: HCPCS

## 2020-03-03 PROCEDURE — PNV: Performed by: PHYSICIAN ASSISTANT

## 2020-03-03 PROCEDURE — NC001 PR NO CHARGE: Performed by: FAMILY MEDICINE

## 2020-03-03 RX ADMIN — SODIUM CHLORIDE 1000 ML: 0.9 INJECTION, SOLUTION INTRAVENOUS at 15:50

## 2020-03-03 NOTE — PROGRESS NOTES
Pt for NST in office today due to insulin dep GDM on 5 units of levemir at night  Good FM, no vb/lof, no cramping/ctxns  Pt has IOL set for 3/20/20  NST today with good FM and good accelerations although noted to have approx 4 episodic variable with a drop of 15-20 beats lasting approx 20 seconds each over a 30 minute time period  I r/w Dr ALMANZAR and pt to L&D for prolonged NST

## 2020-03-03 NOTE — DISCHARGE INSTRUCTIONS
Pregnancy at 28 to [address]:   You are considered full term at the beginning of 37 weeks  Your breathing may be easier if your baby has moved down into a head-down position  You may need to urinate more often because the baby may be pressing on your bladder  You may also feel more discomfort and get tired easily  DISCHARGE INSTRUCTIONS:   Seek care immediately if:   · You develop a severe headache that does not go away  · You have new or increased vision changes, such as blurred or spotted vision  · You have new or increased swelling in your face or hands  · You have vaginal spotting or bleeding  · Your water broke or you feel warm water gushing or trickling from your vagina  Contact your healthcare provider if:   · You have more than 5 contractions in 1 hour  · You notice any changes in your baby's movements  · You have abdominal cramps, pressure, or tightening  · You have a change in vaginal discharge  · You have chills or a fever  · You have vaginal itching, burning, or pain  · You have yellow, green, white, or foul-smelling vaginal discharge  · You have pain or burning when you urinate, less urine than usual, or pink or bloody urine  · You have questions or concerns about your condition or care  How to care for yourself at this stage of your pregnancy:   · Eat a variety of healthy foods  Healthy foods include fruits, vegetables, whole-grain breads, low-fat dairy foods, beans, lean meats, and fish  Drink liquids as directed  Ask how much liquid to drink each day and which liquids are best for you  Limit caffeine to less than 200 milligrams each day  Limit your intake of fish to 2 servings each week  Choose fish low in mercury such as canned light tuna, shrimp, salmon, cod, or tilapia  Do not  eat fish high in mercury such as swordfish, tilefish, minna mackerel, and shark  · Take prenatal vitamins as directed    Your need for certain vitamins and minerals, such as folic acid, increases during pregnancy  Prenatal vitamins provide some of the extra vitamins and minerals you need  Prenatal vitamins may also help to decrease the risk of certain birth defects  · Rest as needed  Put your feet up if you have swelling in your ankles and feet  · Do not smoke  If you smoke, it is never too late to quit  Smoking increases your risk of a miscarriage and other health problems during your pregnancy  Smoking can cause your baby to be born too early or weigh less at birth  Ask your healthcare provider for information if you need help quitting  · Do not drink alcohol  Alcohol passes from your body to your baby through the placenta  It can affect your baby's brain development and cause fetal alcohol syndrome (FAS)  FAS is a group of conditions that causes mental, behavior, and growth problems  · Talk to your healthcare provider before you take any medicines  Many medicines may harm your baby if you take them when you are pregnant  Do not take any medicines, vitamins, herbs, or supplements without first talking to your healthcare provider  Never use illegal or street drugs (such as marijuana or cocaine) while you are pregnant  · Talk to your healthcare provider before you travel  You may not be able to travel in an airplane after 36 weeks  He may also recommend that you avoid long road trips  Safety tips:   · Avoid hot tubs and saunas  Do not use a hot tub or sauna while you are pregnant, especially during your first trimester  Hot tubs and saunas may raise your baby's temperature and increase the risk of birth defects  · Avoid toxoplasmosis  This is an infection caused by eating raw meat or being around infected cat feces  It can cause birth defects, miscarriages, and other problems  Wash your hands after you touch raw meat  Make sure any meat is well-cooked before you eat it  Avoid raw eggs and unpasteurized milk   Use gloves or ask someone else to clean your cat's litter box while you are pregnant  · Ask your healthcare provider about travel  The most comfortable time to travel is during the second trimester  Ask your healthcare provider if you can travel after 36 weeks  You may not be able to travel in an airplane after 36 weeks  He may also recommend that you avoid long road trips  Changes that are happening with your baby:  By 38 weeks, your baby may weigh between 6 and 9 pounds  Your baby may be about 14 inches long from the top of the head to the rump (baby's bottom)  Your baby hears well enough to know your voice  As your baby gets larger, you may feel fewer kicks and more stretching and rolling  Your baby may move into a head-down position  Your baby will also rest lower in your abdomen  What you need to know about prenatal care: Your healthcare provider will check your blood pressure and weight  You may also need the following:  · A urine test  may also be done to check for sugar and protein  These can be signs of gestational diabetes or infection  Protein in your urine may also be a sign of preeclampsia  Preeclampsia is a condition that can develop during week 20 or later of your pregnancy  It causes high blood pressure, and it can cause problems with your kidneys and other organs  · A blood test  may be done to check for anemia (low iron level)  · A Tdap vaccine  may be recommended by your healthcare provider  · A group B strep test  is a test that is done to check for group B strep infection  Group B strep is a type of bacteria that may be found in the vagina or rectum  It can be passed to your baby during delivery if you have it  Your healthcare provider will take swab your vagina or rectum and send the sample to the lab for tests  · Fundal height  is a measurement of your uterus to check your baby's growth  This number is usually the same as the number of weeks that you have been pregnant   Your healthcare provider may also check your baby's position  · Your baby's heart rate  will be checked  © 2017 [address] [employer] Information is for End User's use only and may not be sold, redistributed or otherwise used for commercial purposes  All illustrations and images included in CareNotes® are the copyrighted property of [employer]  or [employer]  The above information is an  only  It is not intended as medical advice for individual conditions or treatments  Talk to your doctor, nurse or pharmacist before following any medical regimen to see if it is safe and effective for you

## 2020-03-03 NOTE — PROGRESS NOTES
L&D Triage Note - OB/GYN  Anitha Draper 29 y o  female MRN: [id number]  Unit/Bed#: LD TRIAGE 4-01 Encounter: [id number]    Patient is seen by Dr Alison Mcleod  _________________________________________________________  ASSESSMENT:  _________________________________________________________  Anitha Draper is a 29 y o  [id number] at 06S5G complicated by gestational diabetes presents current with a category 1 strip (moderate variability, 15x15 accelerations, no decelerations), no regular contractions on toco  _________________________________________________________  PLAN:  _________________________________________________________  1) multiple variable decelerations on NST in office  · Prolonged NST (fetal monitoring)  · Given IV normal saline 1 L bolus over 2 hours  · Subsequent improvement in EFM strip: moderate variability, multiple 15x15 accelerations, no decelerations     2) Continue routine prenatal care  3) Discharge from [facility] triage with term labor precautions     - Reviewed rupture of membranes, false vs true labor, decreased fetal movement, and vaginal bleeding   - Pt to call provider with any concerns and follow up at her next scheduled prenatal appointment     - Case discussed with Dr Suzy Almendarez  _________________________________________________________  SUBJECTIVE:  _________________________________________________________  Anitha Draper 29 y o  [id number] at 37w1d with an Estimated Date of Delivery: 3/23/20 who presents after having approximately 4 episodic variable decelerations lasting approximately 20 seconds each over 30 minutes time period that was noted during NST in office today  Other than having gestational diabetes, she had no problems during this pregnancy  Reports blood pressures have been normal  She feels fetal movements  Denies headache, vision changes, abdominal pain, vaginal bleeding, regular contractions, and LOF  Her current obstetrical history is significant for gestational diabetes  Her past obstetrical history is significant for , pregnancy with macrosomia       Contractions: no regular contractions   Leakage of fluid: no  Vaginal Bleeding: no  Fetal movement: present  _________________________________________________________  OBJECTIVE:  _________________________________________________________  Vitals:    20 1450   BP: 110/59   Pulse: 102   Resp:    Temp:        ROS:  Constitutional: Negative  Respiratory: Negative  Cardiovascular: Negative    Gastrointestinal: Negative    General Physical Exam:  General: in no apparent distress, well developed and well nourished, non-toxic, in no respiratory distress and acyanotic, alert, oriented times 3, afebrile, normal vitals, anicteric and cooperative  Cardiovascular: Cor RRR and No murmurs  Lungs: non-labored breathing  Abdomen: no rebound tenderness, no guarding or rigidity  Lower extremeties: nontender      Fetal monitoring:  FHT: 115  bpm/ Moderate 6 - 25 bpm / 15x15 accelerations present, no decelerations  [place]: contractions not present        Stas Olivas MD  3/3/2020 3:12 PM

## 2020-03-10 ENCOUNTER — ROUTINE PRENATAL (OUTPATIENT)
Dept: OBGYN CLINIC | Facility: CLINIC | Age: 35
End: 2020-03-10
Payer: COMMERCIAL

## 2020-03-10 VITALS — SYSTOLIC BLOOD PRESSURE: 102 MMHG | WEIGHT: 177.6 LBS | DIASTOLIC BLOOD PRESSURE: 54 MMHG | BODY MASS INDEX: 31.46 KG/M2

## 2020-03-10 DIAGNOSIS — O24.414 INSULIN CONTROLLED GESTATIONAL DIABETES MELLITUS (GDM) IN THIRD TRIMESTER: ICD-10-CM

## 2020-03-10 DIAGNOSIS — Z34.83 PRENATAL CARE, SUBSEQUENT PREGNANCY, THIRD TRIMESTER: Primary | ICD-10-CM

## 2020-03-10 PROCEDURE — 59025 FETAL NON-STRESS TEST: CPT | Performed by: NURSE PRACTITIONER

## 2020-03-10 PROCEDURE — PNV: Performed by: NURSE PRACTITIONER

## 2020-03-10 NOTE — PROGRESS NOTES
OFFICE VISIT: Denies any N/V, HA, Cramping, VB, LOF, Edema, Domestic Violence, Smoking  + FM  Tolerating PNV  Pt has induction scheduled for next Friday  Has NST and BPP with LVMFM this upcoming Friday  RTO 1 week or sooner as needed  NST: Reactive, reassuring  FHR Baseline: 120 Moderate variability, (+) Accelerations, (-) Decelerations  TOCO: Quiet

## 2020-03-21 ENCOUNTER — HOSPITAL ENCOUNTER (INPATIENT)
Facility: HOSPITAL | Age: 35
LOS: 1 days | Discharge: HOME | End: 2020-03-22
Attending: OBSTETRICS & GYNECOLOGY | Admitting: OBSTETRICS & GYNECOLOGY
Payer: COMMERCIAL

## 2020-03-21 ENCOUNTER — ANESTHESIA EVENT (INPATIENT)
Dept: ANESTHESIOLOGY | Facility: HOSPITAL | Age: 35
End: 2020-03-21
Payer: COMMERCIAL

## 2020-03-21 ENCOUNTER — ANESTHESIA (INPATIENT)
Dept: ANESTHESIOLOGY | Facility: HOSPITAL | Age: 35
End: 2020-03-21
Payer: COMMERCIAL

## 2020-03-21 ENCOUNTER — HOSPITAL ENCOUNTER (OUTPATIENT)
Dept: LABOR AND DELIVERY | Facility: HOSPITAL | Age: 35
Discharge: HOME | End: 2020-03-21
Payer: COMMERCIAL

## 2020-03-21 DIAGNOSIS — O34.219 VAGINAL BIRTH AFTER CESAREAN DELIVERY: ICD-10-CM

## 2020-03-21 DIAGNOSIS — Z3A.39 39 WEEKS GESTATION OF PREGNANCY: Primary | ICD-10-CM

## 2020-03-21 LAB
ABO GROUP BLD: NORMAL
BASE EXCESS BLDCOA CALC-SCNC: -5.8 MMOL/L (ref 3–11)
BASE EXCESS BLDCOV CALC-SCNC: -4.2 MMOL/L (ref 1–9)
BASOPHILS # BLD AUTO: 0.03 THOUSANDS/ΜL (ref 0–0.1)
BASOPHILS NFR BLD AUTO: 0 % (ref 0–1)
BLD GP AB SCN SERPL QL: NEGATIVE
EOSINOPHIL # BLD AUTO: 0.01 THOUSAND/ΜL (ref 0–0.61)
EOSINOPHIL NFR BLD AUTO: 0 % (ref 0–6)
ERYTHROCYTE [DISTWIDTH] IN BLOOD BY AUTOMATED COUNT: 15.8 % (ref 11.6–15.1)
GLUCOSE SERPL-MCNC: 110 MG/DL (ref 65–140)
GLUCOSE SERPL-MCNC: 63 MG/DL (ref 65–140)
GLUCOSE SERPL-MCNC: 65 MG/DL (ref 65–140)
GLUCOSE SERPL-MCNC: 83 MG/DL (ref 65–140)
GLUCOSE SERPL-MCNC: 84 MG/DL (ref 65–140)
HCO3 BLDCOA-SCNC: 22.8 MMOL/L (ref 17.3–27.3)
HCO3 BLDCOV-SCNC: 19.9 MMOL/L (ref 12.2–28.6)
HCT VFR BLD AUTO: 31 % (ref 34.8–46.1)
HGB BLD-MCNC: 9.9 G/DL (ref 11.5–15.4)
HOLD SPECIMEN: NORMAL
IMM GRANULOCYTES # BLD AUTO: 0.07 THOUSAND/UL (ref 0–0.2)
IMM GRANULOCYTES NFR BLD AUTO: 1 % (ref 0–2)
LYMPHOCYTES # BLD AUTO: 1.14 THOUSANDS/ΜL (ref 0.6–4.47)
LYMPHOCYTES NFR BLD AUTO: 15 % (ref 14–44)
MCH RBC QN AUTO: 27.9 PG (ref 26.8–34.3)
MCHC RBC AUTO-ENTMCNC: 31.9 G/DL (ref 31.4–37.4)
MCV RBC AUTO: 87 FL (ref 82–98)
MONOCYTES # BLD AUTO: 0.64 THOUSAND/ΜL (ref 0.17–1.22)
MONOCYTES NFR BLD AUTO: 9 % (ref 4–12)
NEUTROPHILS # BLD AUTO: 5.62 THOUSANDS/ΜL (ref 1.85–7.62)
NEUTS SEG NFR BLD AUTO: 75 % (ref 43–75)
NRBC BLD AUTO-RTO: 0 /100 WBCS
OXYHGB MFR BLDCOA: 8.1 %
OXYHGB MFR BLDCOV: 87.7 %
PCO2 BLDCOA: 57.8 MM[HG] (ref 30–60)
PCO2 BLDCOV: 34.2 MM HG (ref 27–43)
PH BLDCOA: 7.21 [PH] (ref 7.23–7.43)
PH BLDCOV: 7.38 [PH] (ref 7.19–7.49)
PLATELET # BLD AUTO: 144 THOUSANDS/UL (ref 149–390)
PMV BLD AUTO: 9.6 FL (ref 8.9–12.7)
PO2 BLDCOA: <10 MM HG (ref 5–25)
PO2 BLDCOV: 43.7 MM HG (ref 15–45)
RBC # BLD AUTO: 3.55 MILLION/UL (ref 3.81–5.12)
RH BLD: POSITIVE
SAO2 % BLDCOV: 19.2 ML/DL
SPECIMEN EXPIRATION DATE: NORMAL
WBC # BLD AUTO: 7.51 THOUSAND/UL (ref 4.31–10.16)

## 2020-03-21 PROCEDURE — 86900 BLOOD TYPING SEROLOGIC ABO: CPT | Performed by: OBSTETRICS & GYNECOLOGY

## 2020-03-21 PROCEDURE — 82948 REAGENT STRIP/BLOOD GLUCOSE: CPT

## 2020-03-21 PROCEDURE — 86901 BLOOD TYPING SEROLOGIC RH(D): CPT | Performed by: OBSTETRICS & GYNECOLOGY

## 2020-03-21 PROCEDURE — 99024 POSTOP FOLLOW-UP VISIT: CPT | Performed by: OBSTETRICS & GYNECOLOGY

## 2020-03-21 PROCEDURE — 4A1HXCZ MONITORING OF PRODUCTS OF CONCEPTION, CARDIAC RATE, EXTERNAL APPROACH: ICD-10-PCS | Performed by: OBSTETRICS & GYNECOLOGY

## 2020-03-21 PROCEDURE — 86850 RBC ANTIBODY SCREEN: CPT | Performed by: OBSTETRICS & GYNECOLOGY

## 2020-03-21 PROCEDURE — 82805 BLOOD GASES W/O2 SATURATION: CPT | Performed by: OBSTETRICS & GYNECOLOGY

## 2020-03-21 PROCEDURE — 85025 COMPLETE CBC W/AUTO DIFF WBC: CPT | Performed by: OBSTETRICS & GYNECOLOGY

## 2020-03-21 PROCEDURE — 86592 SYPHILIS TEST NON-TREP QUAL: CPT | Performed by: OBSTETRICS & GYNECOLOGY

## 2020-03-21 RX ORDER — ACETAMINOPHEN 325 MG/1
650 TABLET ORAL EVERY 4 HOURS PRN
Status: DISCONTINUED | OUTPATIENT
Start: 2020-03-21 | End: 2020-03-22 | Stop reason: HOSPADM

## 2020-03-21 RX ORDER — DOCUSATE SODIUM 100 MG/1
100 CAPSULE, LIQUID FILLED ORAL 2 TIMES DAILY
Status: DISCONTINUED | OUTPATIENT
Start: 2020-03-21 | End: 2020-03-22 | Stop reason: HOSPADM

## 2020-03-21 RX ORDER — MAGNESIUM HYDROXIDE/ALUMINUM HYDROXICE/SIMETHICONE 120; 1200; 1200 MG/30ML; MG/30ML; MG/30ML
15 SUSPENSION ORAL EVERY 6 HOURS PRN
Status: DISCONTINUED | OUTPATIENT
Start: 2020-03-21 | End: 2020-03-22 | Stop reason: HOSPADM

## 2020-03-21 RX ORDER — OXYCODONE HYDROCHLORIDE 5 MG/1
5 TABLET ORAL EVERY 4 HOURS PRN
Status: DISCONTINUED | OUTPATIENT
Start: 2020-03-21 | End: 2020-03-22 | Stop reason: HOSPADM

## 2020-03-21 RX ORDER — METOCLOPRAMIDE HYDROCHLORIDE 5 MG/ML
5 INJECTION INTRAMUSCULAR; INTRAVENOUS EVERY 6 HOURS PRN
Status: DISCONTINUED | OUTPATIENT
Start: 2020-03-21 | End: 2020-03-21

## 2020-03-21 RX ORDER — IBUPROFEN 600 MG/1
600 TABLET ORAL EVERY 6 HOURS PRN
Status: DISCONTINUED | OUTPATIENT
Start: 2020-03-21 | End: 2020-03-22 | Stop reason: HOSPADM

## 2020-03-21 RX ORDER — OXYTOCIN/RINGER'S LACTATE 30/500 ML
1-30 PLASTIC BAG, INJECTION (ML) INTRAVENOUS
Status: DISCONTINUED | OUTPATIENT
Start: 2020-03-21 | End: 2020-03-21

## 2020-03-21 RX ORDER — BUPIVACAINE HYDROCHLORIDE 2.5 MG/ML
INJECTION, SOLUTION EPIDURAL; INFILTRATION; INTRACAUDAL AS NEEDED
Status: DISCONTINUED | OUTPATIENT
Start: 2020-03-21 | End: 2020-03-21 | Stop reason: SURG

## 2020-03-21 RX ORDER — SENNOSIDES 8.6 MG
1 TABLET ORAL DAILY
Status: DISCONTINUED | OUTPATIENT
Start: 2020-03-22 | End: 2020-03-22 | Stop reason: HOSPADM

## 2020-03-21 RX ORDER — SIMETHICONE 80 MG
80 TABLET,CHEWABLE ORAL 4 TIMES DAILY PRN
Status: DISCONTINUED | OUTPATIENT
Start: 2020-03-21 | End: 2020-03-22 | Stop reason: HOSPADM

## 2020-03-21 RX ORDER — SODIUM CHLORIDE 9 MG/ML
125 INJECTION, SOLUTION INTRAVENOUS CONTINUOUS
Status: DISCONTINUED | OUTPATIENT
Start: 2020-03-21 | End: 2020-03-21

## 2020-03-21 RX ORDER — DIAPER,BRIEF,INFANT-TODD,DISP
1 EACH MISCELLANEOUS AS NEEDED
Status: DISCONTINUED | OUTPATIENT
Start: 2020-03-21 | End: 2020-03-22 | Stop reason: HOSPADM

## 2020-03-21 RX ORDER — CALCIUM CARBONATE 200(500)MG
1000 TABLET,CHEWABLE ORAL DAILY PRN
Status: DISCONTINUED | OUTPATIENT
Start: 2020-03-21 | End: 2020-03-22 | Stop reason: HOSPADM

## 2020-03-21 RX ORDER — ONDANSETRON 2 MG/ML
4 INJECTION INTRAMUSCULAR; INTRAVENOUS EVERY 4 HOURS PRN
Status: DISCONTINUED | OUTPATIENT
Start: 2020-03-21 | End: 2020-03-21

## 2020-03-21 RX ORDER — DIPHENHYDRAMINE HYDROCHLORIDE 50 MG/ML
25 INJECTION INTRAMUSCULAR; INTRAVENOUS EVERY 6 HOURS PRN
Status: DISCONTINUED | OUTPATIENT
Start: 2020-03-21 | End: 2020-03-21

## 2020-03-21 RX ADMIN — SODIUM CHLORIDE 5 MILLION UNITS: 0.9 INJECTION, SOLUTION INTRAVENOUS at 08:42

## 2020-03-21 RX ADMIN — SODIUM CHLORIDE 125 ML/HR: 0.9 INJECTION, SOLUTION INTRAVENOUS at 16:30

## 2020-03-21 RX ADMIN — SODIUM CHLORIDE 2.5 MILLION UNITS: 9 INJECTION, SOLUTION INTRAVENOUS at 16:57

## 2020-03-21 RX ADMIN — SODIUM CHLORIDE 125 ML/HR: 0.9 INJECTION, SOLUTION INTRAVENOUS at 12:44

## 2020-03-21 RX ADMIN — Medication 2 MILLI-UNITS/MIN: at 09:08

## 2020-03-21 RX ADMIN — SODIUM CHLORIDE 2.5 MILLION UNITS: 9 INJECTION, SOLUTION INTRAVENOUS at 12:42

## 2020-03-21 RX ADMIN — BUPIVACAINE HYDROCHLORIDE 1.2 ML: 2.5 INJECTION, SOLUTION EPIDURAL; INFILTRATION; INTRACAUDAL at 14:30

## 2020-03-21 RX ADMIN — ROPIVACAINE HYDROCHLORIDE: 2 INJECTION, SOLUTION EPIDURAL; INFILTRATION at 14:30

## 2020-03-21 RX ADMIN — WITCH HAZEL 1 PAD: 500 SOLUTION RECTAL; TOPICAL at 21:56

## 2020-03-21 RX ADMIN — SODIUM CHLORIDE 999 ML/HR: 0.9 INJECTION, SOLUTION INTRAVENOUS at 08:28

## 2020-03-21 NOTE — DISCHARGE INSTRUCTIONS
Self Care After Delivery   AMBULATORY CARE:   The postpartum period  is the period of time from delivery to about 6 weeks  During this time you may experience many physical and emotional changes  It is important to understand what is normal and when you need to call your healthcare provider  It is also important to know how to care for yourself during this time  Call 911 for any of the following:   · You soak through 1 pad in 15 minutes, have blurry vision, clammy or pale skin, and feel faint  · You faint or lose consciousness  · Your heart is beating faster than normal      · You have trouble breathing  · You cough up blood  Seek care immediately if:   · You soak through 1 or more pads in an hour, or pass blood clots larger than a quarter from your vagina  · Your leg is painful, red, and larger than normal      · You have severe abdominal pain  · You have a bad headache or changes in your vision  · Your episiotomy or C section incision is red, swollen, bleeding, or draining pus  · Your incision comes apart  · You see or hear things that are not there, or have thoughts of harming yourself or your baby  Contact your obstetrician or midwife if:   · You have a fever  · You have new or worsening pain in your abdomen or vagina  · You continue to have the baby blues 10 days after you deliver  · You have trouble sleeping  · You have foul-smelling discharge from your vagina  · You have pain or burning when you urinate  · You do not have a bowel movement for 3 days or more  · You have nausea or are vomiting  · You have hard lumps or red streaks over your breasts  · You have cracked nipples or bleed from your nipples  · You have questions or concerns about your condition or care  Physical changes:   The following are normal changes after you give birth:  · Pain in the area between your anus and vagina    · Breast pain    · Constipation or hemorrhoids    · Hot or cold flashes    · Vaginal bleeding or discharge    · Mild to moderate abdominal cramping    · Difficulty controlling bowel movements or urine  Emotional changes: The following are symptoms of the baby blues, or normal emotions after you give birth  The changes in your emotions may be caused by a drop in hormone levels after you deliver  If these symptoms last longer than 1 to 2 weeks after you give birth, you may have postpartum depression  · Feeling irritable    · Feeling sad    · Crying for no reason    · Feeling anxious  Breast care for nursing mothers: You may have sore breasts for 3 to 6 days after you give birth  This happens as your milk begins to fill your breasts  You may also have sore breasts if you do not breastfeed frequently  Do the following to care for your breasts:  · Apply a moist, warm, compress to your breast as directed  This may help soothe your breasts  Make sure the washcloth is not too hot before you apply it to your breast      · Nurse your baby or pump your milk frequently  This may prevent clogged milk ducts  Ask your healthcare provider how often to nurse or pump  · Massage your breasts as directed  This may help increase your milk flow  Gently rub your breasts in a circular motion before you breastfeed  You may need to gently squeeze your breast or nipple to help release milk  You can also use a breast pump to help release milk from your breast      · Wash your breasts with warm water only  Do not put soap on your nipples  Soap may cause your nipples to become dry  · Apply lanolin cream to your nipples as directed  Lanolin cream may add moisture to your skin and prevent nipple dryness  Always  wash off lanolin cream with warm water before you breastfeed  · Place pads in your bra  Your nipples may leak milk when you are not breastfeeding  You can place pads inside of your bra to help prevent leaking onto your clothing   Ask your healthcare provider where to purchase bra pads  · Get breastfeeding support if needed  There are healthcare providers who can answer questions about breastfeeding and provide you with support  Ask your healthcare provider who you can contact if you need breastfeeding support  Breast care for non-nursing mothers:  Milk will fill your breasts even if you bottle feed your baby  Do the following to help stop your milk from filling your breasts and causing pain:  · Wear a bra with support at all times  A sports bra or a tight-fitting bra will help stop your milk from coming in  · Apply ice on each breast for 15 to 20 minutes every hour or as directed  Use an ice pack, or put crushed ice in a plastic bag  Cover it with a towel  Ice helps your milk ducts shrink  · Keep your breasts away from warm water  Warm water will make it easier for milk to fill your breasts  Stand with your breasts away from warm water in the shower  · Limit how much you touch your breasts  This will prevent them from filling with milk  Perineum care: Your perineum is the area between your rectum and vagina  It is normal to have swelling and pain in this area after you give birth  If you had an episiotomy, your healthcare provider may give you special instructions  · Clean your perineum after you use the bathroom  This may prevent infection and help with healing  Use a spray bottle with warm water to clean your perineum  You may also gently spray warm water against your perineum when you urinate  Always wipe front to back  · Take a sitz bath as directed  A sitz bath may help relieve swelling and pain  Fill your bath tub or bucket with water up to your hips and sit in the water  Use cold water for 2 days after you deliver  Then use warm water  Ask your healthcare provider for more information about a sitz bath  · Apply ice packs for the first 24 hours or as directed  Use a plastic glove filled with ice or buy an ice pack   Wrap the ice pack or plastic glove in a small towel or wash cloth  Place the ice pack on your perineum for 20 minutes at a time  · Sit on a donut-shaped pillow  This may relieve pressure on your perineum when you sit  · Use wipes with medicine or take pills as directed  Your healthcare provider may tell you to use witch hazel pads  You can place witch hazel pads in the refrigerator before you apply them to your perineum  He may also tell you to take NSAIDs  Ask your healthcare provider how often to take pills or use wipes with medicine  · Do not go swimming or take tub baths for 4 to 6 weeks or as directed  This will help prevent an infection in your vagina or uterus  Bowel and bladder care: It may take 3 to 5 days to have a bowel movement after you deliver your baby  You can do the following to prevent or manage constipation, and get control of your bowel or bladder:  · Take stool softeners as directed  A stool softener is medicine that will make your bowel movements softer  This may prevent or relieve constipation  A stool softener may also make bowel movements less painful  · Drink plenty of liquids  Ask how much liquid to drink each day and which liquids are best for you  Liquids may help prevent constipation  · Eat foods high in fiber  Examples include fruits, vegetables, grains, beans, and lentils  Ask your healthcare provider how much fiber you need each day  Fiber may prevent constipation  · Do Kegel exercises as directed  Kegel exercises will help strengthen the muscles that control bowel movements and urination  Ask your healthcare provider for more information on Kegel exercises  · Apply cold compresses or medicine to hemorrhoids as directed  This may relieve swelling and pain  Your healthcare provider may tell you to apply ice or wipes with medicine to your hemorrhoids  He may also tell you to use a sitz bath   Ask your healthcare for more information on how to manage hemorrhoids  Nutrition:  Good nutrition is important in the postpartum period  It will help you return to a healthy weight, increase your energy levels, and prevent constipation  It will also help you get enough nutrients and calories if you are going to breastfeed your baby  · Eat a variety of healthy foods  Healthy foods include fruits, vegetables, whole-grain breads, low-fat dairy products, beans, lean meats, and fish  You may need 500 to 700 additional calories each day if you breastfeed your baby  You may also need extra protein  · Limit foods with added sugar and high amounts of fat  These foods are high in calories and low in healthy nutrients  Read food labels so you know how much sugar and fat is in the food you want to eat  · Drink 8 to 10 glasses of water per day  Water will help you make plenty of milk for your baby  It will also help prevent constipation  Drink a glass of water every time you breastfeed your baby  · Take vitamins as directed  Ask your healthcare provider what vitamins you need  · Limit caffeine and alcohol if you are breastfeeding  Caffeine and alcohol can get into your breast milk  Caffeine and alcohol can make your baby fussy  They can also interfere with your baby's sleep  Ask your healthcare provider if you can drink alcohol or caffeine  Rest and sleep: You may feel very tired in the postpartum period  Enough sleep will help you heal and give you energy to care for your baby  The following may help you get sleep and rest:  · Nap when your baby naps  Your baby may nap several times during the day  Get rest during this time  · Limit visitors  Many people may want to see you and your baby  Ask friends or family to visit on different days  This will give you time to rest      · Do not plan too much for one day  Put off household chores so that you have time to rest  Gradually do more each day  · Ask for help from family, friends, or neighbors    Ask them to help you with laundry, cleaning, or errands  Also ask someone to watch the baby while you take a nap or relax  Ask your partner to help with the care of your baby  Pump some of your breast milk so your partner can feed your baby during the night  Exercise after delivery:  Wait until your healthcare provider says it is okay to exercise  Exercise can help you lose weight, increase your energy levels, and manage your mood  It can also prevent constipation and blood clots  Start with gentle exercises such as walking  Do more as you have more energy  You may need to avoid abdominal exercises for 1 to 2 weeks after you deliver  Talk to your healthcare provider about an exercise plan that is right for you  Sexual activity after delivery:   · Do not have sex until your healthcare provider says it is okay  You may need to wait 4 to 6 weeks before you have sex  This may prevent infection and allow time to heal      · Your menstrual cycle may begin as soon as 3 weeks after you deliver  Your period may be delayed if you breastfeed your baby  You can become pregnant before you get your first postpartum period  Talk to your healthcare provider about birth control that is right for you  Some types of birth control are not safe during breastfeeding  For support and more information:  Join a support group for new mothers  Ask for help from family and friends with chores, errands, and care of your baby  · Office of Women's Health, [place] Department of Health and Human Services  [address], [address] [place]  [place] , [address]  [address], [address] [place]  [place] , [address]  Phone: [phone]  Web Address: www womenshealth gov  · [facility] Postpartum [address]  [address]  Web Address: [employer]ots be  org/pregnancy/postpartum-care  aspx  Follow up with your obstetrician or midwife as directed:   You will need to follow up with your healthcare provider in 2 to 6 weeks after delivery  Write down your questions so you remember to ask them at your visits  © 2017 [address] Information is for End User's use only and may not be sold, redistributed or otherwise used for commercial purposes  All illustrations and images included in CareNotes® are the copyrighted property of A D A M , Inc  or [employer]  The above information is an  only  It is not intended as medical advice for individual conditions or treatments  Talk to your doctor, nurse or pharmacist before following any medical regimen to see if it is safe and effective for you

## 2020-03-21 NOTE — H&P
H&P Exam - Obstetrics   Bernard Nino 29 y o  female MRN: [id number]  Unit/Bed#: -01 Encounter: [id number]      History of Present Illness     Chief Complaint: Induction of labor    HPI:  Bernard Nino is a 29 y o  [id number] female with an SINTIA of 3/23/2020, by Last Menstrual Period at 39w5d weeks gestation who is being admitted for induction of labor for A2GDM  She is a TOLAC with two prior VBACs  Contractions: no  Loss of fluid: no  Vaginal bleeding: no  Fetal movement: yes    She is Burgey/Schulz patient  PREGNANCY COMPLICATIONS:   1) [id number]: Levemir 5u qhs  2) Prior  section:  x2    OB History    Para Term  AB Living   5 3 3   1 3   SAB TAB Ectopic Multiple Live Births   1     0 3      # Outcome Date GA Lbr Calos/2nd Weight Sex Delivery Anes PTL Lv   5 Current            4 Term 18 39w2d 00:39 / 00:18 3765 g (8 lb 4 8 oz) F Vag-Spont EPI N ANGEL   3 SAB  7w0d          2 Term 02/23/15 39w0d  3515 g (7 lb 12 oz) F  EPI N ANGEL   1 Term 13 39w0d  4196 g (9 lb 4 oz) F CS-Unspec Spinal N ANGEL      Obstetric Comments   :  PRIMARY LTCS F 9LBS 4OZ - 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 VENTRICULOMEGALY;  -  F 7LBS 12OZ; 2016 SAB NATURAL 5 Wk; (2014 - CF NEG); 2018  F    insullin GDM       Baby complications/comments: None    Review of Systems   Constitutional: Negative for diaphoresis and fever  HENT: Negative for hearing loss, nosebleeds, tinnitus and trouble swallowing  Eyes: Negative for photophobia and visual disturbance  Respiratory: Negative for apnea, shortness of breath and wheezing  Cardiovascular: Negative for chest pain and palpitations  Gastrointestinal: Negative for blood in stool and vomiting  Endocrine: Negative for polydipsia and polyphagia  Genitourinary: Negative for dysuria and hematuria  Musculoskeletal: Negative for arthralgias, gait problem and myalgias  Skin: Negative for color change and pallor  Neurological: Negative for dizziness, seizures, facial asymmetry, speech difficulty, numbness and headaches  Psychiatric/Behavioral: Negative for agitation, behavioral problems and confusion  Historical Information   Past Medical History:   Diagnosis Date    Anemia     hx of    Diabetes mellitus ([address] )     With this pregnancy GDM on insulin    Seasonal allergies     Spontaneous      RESOLVED: [id number]    Threatened      LAST ASSESSED: [id number]    Varicella     had as child and received vaccine per pt immune    Visual impairment     eyewear      Past Surgical History:   Procedure Laterality Date     SECTION  2013    LOW TRANSVERSE    KNEE ARTHROSCOPY W/ MENISCAL REPAIR Right 2003    MEDIAL    KNEE CARTILAGE SURGERY Right     2003     Social History   Social History     Substance and Sexual Activity   Alcohol Use No     Social History     Substance and Sexual Activity   Drug Use No     Social History     Tobacco Use   Smoking Status Never Smoker   Smokeless Tobacco Never Used     Family History: non-contributory    Meds/Allergies      Medications Prior to Admission   Medication    glucose blood test strip    LEVEMIR FLEXTOUCH 100 units/mL injection pen    Prenat w/o A-FeCbGl-DSS-FA-DHA (PNV OB+DHA PO)        Allergies   Allergen Reactions    Kiwi Extract Rash and Swelling     Mouth irritation  OBJECTIVE:    Vitals: Blood pressure 118/67, pulse 96, temperature 99 3 °F (37 4 °C), temperature source Temporal, resp  rate 18, height 5' 3" (1 6 m), weight 80 3 kg (177 lb), last menstrual period 2019, currently breastfeeding  Body mass index is 31 35 kg/m²  Physical Exam   Constitutional: She appears well-nourished  HENT:   Head: Normocephalic and atraumatic  Cardiovascular: Normal rate, regular rhythm and normal heart sounds  Pulmonary/Chest: Breath sounds normal  No respiratory distress  She has no wheezes     Abdominal: Bowel sounds are normal  There is no tenderness          [place]: Deferred  Nitrazine: Deferred    Cervix:  Dilation: 2  Effacement (%): 50  Station: -2    Fetal heart rate:   Baseline Rate: 125 bpm  FHR Category: Category I    [place]:   Contraction Frequency (minutes): None  Contraction Duration (seconds): 60  Contraction Quality: Mild    Placenta: Posterior, fundal    Position: Verted by ultrasound    EFW: 8lbs    GBS: Positive    Prenatal Labs:   Blood Type:   Lab Results   Component Value Date/Time    ABO Grouping A 09/18/2019 02:34 PM    ABO Grouping A 02/22/2015 05:27 PM     , D (Rh type):   Lab Results   Component Value Date/Time    Rh Factor Positive 09/18/2019 02:34 PM    Rh Factor Positive 02/22/2015 05:27 PM     , Antibody Screen:   Lab Results   Component Value Date/Time    Antibody Screen Negative 02/22/2015 05:27 PM    , HCT/HGB:   Lab Results   Component Value Date/Time    Hematocrit 31 0 (L) 03/21/2020 08:13 AM    Hematocrit 31 7 (L) 02/24/2015 04:52 AM    Hemoglobin 9 9 (L) 03/21/2020 08:13 AM    Hemoglobin 10 1 (L) 02/24/2015 04:52 AM      , MCV:   Lab Results   Component Value Date/Time    MCV 87 03/21/2020 08:13 AM    MCV 88 02/24/2015 04:52 AM      , Platelets:   Lab Results   Component Value Date/Time    Platelets 843 (L) 00/64/4838 08:13 AM    Platelets 532 (L) 78/38/4361 04:52 AM      , 1 hour Glucola:   Lab Results   Component Value Date/Time    GLUCOSE 1 HR 50 GM GLUC CHALLENGE-PREG  12/06/2014 08:57 AM    Glucose 159 (H) 01/04/2020 09:22 AM   , 3 hour GTT:   Lab Results   Component Value Date/Time    Glucose, GTT - 3 Hour 94 01/08/2020 10:31 AM    Rubella:   Lab Results   Component Value Date/Time    RUBELLA IGG QUANTITATION 160 0 12/06/2014 08:57 AM    Rubella IgG Quant >175 0 09/18/2019 02:24 PM        , VDRL/RPR:   Lab Results   Component Value Date/Time    RPR SCREEN Non-Reactive (q 02/22/2015 05:27 PM    RPR Non-Reactive 09/18/2019 02:24 PM      , Urine Culture/Screen:   Lab Results   Component Value Date/Time Urine Culture <10,000 cfu/ml  2019 01:23 PM   Hep B:   Lab Results   Component Value Date/Time    HEPATITIS B SURFACE ANTIGEN Non-Reactive (q 2014 08:57 AM    Hepatitis B Surface Ag Non-reactive 2019 02:24 PM    HIV:   Lab Results   Component Value Date/Time    HIV-1/2 AB-AG Non-Reactive (q 2014 08:57 AM    HIV-1/HIV-2 Ab Non-Reactive 2019 02:24 PM     Group B Strep:    Lab Results   Component Value Date/Time    Strep Grp B PCR Positive for Beta Hemolytic Strep Grp B by PCR (A) 2020 11:34 AM        Assessment/Plan     ASSESSMENT:  30yo  at 39w5d weeks gestation who is being admitted for induction of labor  PLAN:   - Admit   - CBC, RPR, Blood Type   - Start with pitocin titration   - GBS positive status:  PCN for prophylaxis    - Analgesia and/or epidural at patient request   - Discussed with Dr Ori Henderson      This patient will be an INPATIENT  and I certify the anticipated length of stay is >2 Midnights      Tereso Troy MD  3/21/2020  8:58 AM

## 2020-03-21 NOTE — L&D DELIVERY NOTE
Delivery Note    Obstetrician:   Wendy Baeza    Assistant: Kadi Restrepo    Pre-Delivery Diagnosis: Term pregnancy, Induced labor, Single fetus, A2GDM and GBS positive    Post-Delivery Diagnosis: Same as above - Delivered    Procedure:      Episiotomy: none    Laceration: none    Estimated Blood Loss:  QBL done           Complications:  None    Venous Ph: 7 383  BE -4 2  Arterial Ph: 7 214  BE -5 8    Details: Patient delivered a viable Female  over intact perineum, nuchal cord times one reduced on perineum  After delivery of the  and appropriate delay, the umbilical cord was doubly clamped and cut and the  was passed to staff for routine care  Umbilical cord blood and umbilical artery and venous gases were collected  Active management of the third stage of labor was undertaken with IV pitocin  Bleeding was noted to be under control  Placenta delivered intact with 3-v cord and trailing membranes  Inspection of the perineum revealed intact perineum and good hemostasis  Mother and baby are currently recovering nicely in stable condition             Attending Attestation: I was present for the entire procedure

## 2020-03-21 NOTE — OB LABOR/OXYTOCIN SAFETY PROGRESS
Oxytocin Safety Progress Check Note - Rey Kennedy 29 y o  female MRN: [id number]    Unit/Bed#: -01 Encounter: [id number]    Dose (ta-units/min) Oxytocin: 10 ta-units/min  Contraction Frequency (minutes): 1-3  Contraction Quality: Moderate  Tachysystole: No   Dilation: 6        Effacement (%): 80  Station: 0   Baseline rate: baseline 110  Fetal Heart Rate: 125 BPM  FHR Category: Category I             Notes/comments:   Patient with small area of discomfort RLQ just received redose  Continue to monitor for progress and titrate oxytocin accordingly      Konstantin Kulkarni MD 3/21/2020 4:59 PM

## 2020-03-21 NOTE — OB LABOR/OXYTOCIN SAFETY PROGRESS
Oxytocin Safety Progress Check Note - Gae Jobs 29 y o  female MRN: [id number]    Unit/Bed#: -01 Encounter: [id number]    Dose (ta-units/min) Oxytocin: 8 ta-units/min  Contraction Frequency (minutes): 1 5-2  Contraction Quality: Mild  Tachysystole: No   Dilation: 3        Effacement (%): 70  Station: -1  Baseline Rate: 115 bpm  Fetal Heart Rate: 110 BPM  FHR Category: Category I             Notes/comments:   arom clear, continue oxytocin titration for adequate labor      Monserrat Cruz MD 3/21/2020 12:46 PM

## 2020-03-21 NOTE — OB LABOR/OXYTOCIN SAFETY PROGRESS
Oxytocin Safety Progress Check Note - Charanjit Dawn 29 y o  female MRN: [id number]    Unit/Bed#: -01 Encounter: [id number]    Dose (ta-units/min) Oxytocin: 10 ta-units/min  Contraction Frequency (minutes): 1 5-4  Contraction Quality: Mild  Tachysystole: No   Dilation: 4        Effacement (%): 80  Station: 0  Baseline Rate: 115 bpm  Fetal Heart Rate: 122 BPM  FHR Category: Category I             Notes/comments:   Patient comfortable with epidural   Will continue to evaluate and titrate oxytocin as needed      Gaudencio Conner MD 3/21/2020 2:39 PM

## 2020-03-21 NOTE — DISCHARGE SUMMARY
Discharge Summary - OB/GYN   Thierry Peter 29 y o  female MRN: [id number]  Unit/Bed#: -01 Encounter: [id number]      Admission Date: 3/21/2020     Discharge Date: 3/22/2020    Admitting Diagnosis:   1  Pregnancy at 39w5d  2  Induction of labor  3  GBS positive   4  TOLAC,  x2  5  A2GDM    Discharge Diagnosis:   Same, delivered    Procedures: spontaneous vaginal delivery    Delivery Attending: Tarun Cash MD  Discharge Attending: Banner Course:     Thierry Peter is a 29 y o  [id number] at 39w5d wks who was initially admitted for induction of labor  Pregnancy was complicated by prior  section and A2 GDM  She was started on Pitocin for induction  She delivered a viable female  on 3/21/2020 at [address]  Weight 7lbs 11 1oz via spontaneous vaginal delivery  Apgars were 9 (1 min) and 9 (5 min)   was transferred to  nursery  Patient tolerated the procedure well and was transferred to recovery in stable condition  Her postpartum course was uncomplicated    Her postpartum pain was well controlled with oral analgesics  On day of discharge, she was ambulating and able to reasonably perform all ADLs  She was voiding and had appropriate bowel function  Pain was well controlled  She was discharged home on post-partum day #1 without complications  Patient was instructed to follow up with her OB as an outpatient and was given appropriate warnings to call provider if she develops signs of infection or uncontrolled pain  Complications: none apparent    Condition at discharge: good     Discharge instructions/Information to patient and family:   - Do not place anything (no partner, tampons or douche) in your vagina for 6 weeks    -You may walk for exercise for the first 6 weeks then gradually return to your usual activities    -Please do not drive for 1 week if you have no stitches and for 2 weeks if you have stitches or underwent a  delivery     -You may take baths or shower per your preference    -Please look at your bust (breasts) in the mirror daily and call for redness or tenderness or increased warmth    -Please call us for temperature > 100 4*F or 38* C, worsening pain or a foul discharge  Discharge Medications:   Prenatal vitamin daily for 6 months or the duration of nursing whichever is longer    Motrin 600 mg orally every 6 hours as needed for pain  Tylenol (over the counter) per bottle directions as needed for pain: do NOT use with percocet  Hydrocortisone cream 1% (over the counter) applied 1-2x daily to hemorrhoids as needed    Planned Readmission: No      Cassandra Heimlich, MD, PGY-2  3/28/2020  6:00 AM

## 2020-03-21 NOTE — ANESTHESIA PREPROCEDURE EVALUATION
Review of Systems/Medical History  Patient summary reviewed  Chart reviewed  No history of anesthetic complications     Cardiovascular  Negative cardio ROS    Pulmonary  Negative pulmonary ROS        GI/Hepatic  Negative GI/hepatic ROS          Negative  ROS        Endo/Other  Diabetes gestational Insulin,   Obesity    GYN  Currently pregnant , Prior pregnancy/OB history : 5 Parity: 3,     Comment: Hx of c/s x1   x2     Hematology  Anemia ,     Musculoskeletal  Negative musculoskeletal ROS        Neurology  Negative neurology ROS      Psychology   Negative psychology ROS              Physical Exam    Airway    Mallampati score: II  TM Distance: >3 FB  Neck ROM: full     Dental   No notable dental hx     Cardiovascular  Comment: Negative ROS, Rhythm: regular, Rate: normal, Cardiovascular exam normal    Pulmonary  Pulmonary exam normal Breath sounds clear to auscultation,     Other Findings        Anesthesia Plan  ASA Score- 2     Anesthesia Type- epidural and spinal with ASA Monitors  Additional Monitors:   Airway Plan:         Plan Factors-    Induction-     Postoperative Plan-     Informed Consent- Anesthetic plan and risks discussed with patient  I personally reviewed this patient with the CRNA  Discussed and agreed on the Anesthesia Plan with the CRNA  Beth Palacios Recent labs personally reviewed:  Lab Results   Component Value Date    WBC 7 51 2020    HGB 9 9 (L) 2020     (L) 2020     No results found for: NA, K, BUN, CREATININE, GLUCOSE  No results found for: PTT   No results found for: INR    Blood type A    No results found for: Bernie Ceja MD, have personally seen and evaluated the patient prior to anesthetic care  I have reviewed the pre-anesthetic record, and other medical records if appropriate to the anesthetic care  If a CRNA is involved in the case, I have reviewed the CRNA assessment, if present, and agree   Risks/benefits and alternatives discussed with patient including possible PONV, sore throat, and possibility of rare anesthetic and surgical emergencies

## 2020-03-21 NOTE — ANESTHESIA PROCEDURE NOTES
CSE Block    Patient location during procedure: OB  Start time: 3/21/2020 2:30 PM  Reason for block: procedure for pain and at surgeon's request  Staffing  Anesthesiologist: Ruchi Lang MD  Performed: anesthesiologist   Preanesthetic Checklist  Completed: patient identified, site marked, surgical consent, pre-op evaluation, timeout performed, IV checked, risks and benefits discussed and monitors and equipment checked  CSE  Patient position: sitting  Prep: ChloraPrep  Patient monitoring: cardiac monitor and continuous pulse ox  Approach: midline  Spinal Needle  Needle type: pencil-tip   Needle gauge: 27 G  Needle length: 10 cm  Epidural Needle  Injection technique: KUSHAL saline  Needle type: Tuohy   Needle gauge: 18 G  Location: lumbar (1-5)  Catheter  Catheter type: side hole  Catheter size: 20 G  Catheter at skin depth: 10 cm  Test dose: negative  Assessment  Injection Assessment:  negative aspiration for heme, no paresthesia on injection, positive aspiration for clear CSF and no pain on injection

## 2020-03-21 NOTE — OB LABOR/OXYTOCIN SAFETY PROGRESS
Oxytocin Safety Progress Check Note - Jose Roberto Patel 29 y o  female MRN: [id number]    Unit/Bed#: -01 Encounter: [id number]    Dose (ta-units/min) Oxytocin: 6 ta-units/min  Contraction Frequency (minutes): 3-4  Contraction Quality: Mild  Tachysystole: No   Dilation: 3        Effacement (%): 50  Station: -2  Baseline Rate: 115 bpm  Fetal Heart Rate: 120 BPM  FHR Category: Category I             Notes/comments:   SVE as above  Continue pitocin titration      Gustavo Cohen MD 3/21/2020 11:14 AM

## 2020-03-22 VITALS
SYSTOLIC BLOOD PRESSURE: 99 MMHG | RESPIRATION RATE: 18 BRPM | OXYGEN SATURATION: 99 % | TEMPERATURE: 98.1 F | WEIGHT: 177 LBS | BODY MASS INDEX: 31.36 KG/M2 | DIASTOLIC BLOOD PRESSURE: 51 MMHG | HEIGHT: 63 IN | HEART RATE: 81 BPM

## 2020-03-22 PROCEDURE — 99024 POSTOP FOLLOW-UP VISIT: CPT | Performed by: OBSTETRICS & GYNECOLOGY

## 2020-03-22 RX ORDER — IBUPROFEN 600 MG/1
600 TABLET ORAL EVERY 6 HOURS PRN
Qty: 30 TABLET | Refills: 0
Start: 2020-03-22 | End: 2021-11-24 | Stop reason: HOSPADM

## 2020-03-22 RX ORDER — ACETAMINOPHEN 325 MG/1
650 TABLET ORAL EVERY 4 HOURS PRN
Qty: 30 TABLET | Refills: 0
Start: 2020-03-22 | End: 2021-11-24 | Stop reason: HOSPADM

## 2020-03-22 RX ADMIN — DOCUSATE SODIUM 100 MG: 100 CAPSULE, LIQUID FILLED ORAL at 07:49

## 2020-03-22 RX ADMIN — IBUPROFEN 600 MG: 600 TABLET ORAL at 05:01

## 2020-03-22 RX ADMIN — IRON SUCROSE 200 MG: 20 INJECTION, SOLUTION INTRAVENOUS at 06:44

## 2020-03-22 RX ADMIN — DOCUSATE SODIUM 100 MG: 100 CAPSULE, LIQUID FILLED ORAL at 18:42

## 2020-03-22 RX ADMIN — IBUPROFEN 600 MG: 600 TABLET ORAL at 18:44

## 2020-03-22 NOTE — PROGRESS NOTES
Progress Note - OB/GYN   Charanjit Dawn 29 y o  female MRN: [id number]  Unit/Bed#:  305-01 Encounter: [id number]    Assessment:  Post partum Day #1 s/p , stable, baby in room    Plan:  1) A2GDM   - Patient will require 2h gtt in 6-12 weeks  2) Continue routine post partum care   Encourage ambulation   Encourage breastfeeding   Anticipate early discharge today pending      Subjective/Objective   Chief Complaint:     Post delivery  Patient is doing well  Lochia WNL  Pain well controlled  Subjective:     Pain: yes, cramping, improved with meds  Tolerating PO: yes  Voiding: yes  Flatus: yes  BM: no  Ambulating: yes  Breastfeeding:  yes  Chest pain: no  Shortness of breath: no  Leg pain: no  Lochia: minimal    Objective:     Vitals: BP 94/50 (BP Location: Left arm)   Pulse 85   Temp 97 9 °F (36 6 °C) (Temporal)   Resp 18   Ht 5' 3" (1 6 m)   Wt 80 3 kg (177 lb)   LMP 2019   SpO2 98%   Breastfeeding Yes   BMI 31 35 kg/m²       Intake/Output Summary (Last 24 hours) at 3/22/2020 0625  Last data filed at 3/22/2020 0130  Gross per 24 hour   Intake --   Output 2084 ml   Net -2084 ml       Lab Results   Component Value Date    WBC 7 51 2020    HGB 9 9 (L) 2020    HCT 31 0 (L) 2020    MCV 87 2020     (L) 2020       Physical Exam:     Gen: AAOx3, NAD  CV: RRR  Lungs: CTA b/l  Abd: Soft, non-tender, non-distended, no rebound or guarding  Uterine fundus firm and non-tender, -2 cm below the umbilicus     Ext: Non tender, Negative Belle's sign bilaterally    Yary Ferrari MD  3/22/2020  6:25 AM

## 2020-03-22 NOTE — LACTATION NOTE
This note was copied from a baby's chart  Mom continues to C/O some nipple discomfort  Observed infant at breast in cradle hold  Shallow latch noted  Assisted infant to breast in cradle hold with demonstration of how to obtain a deeper latch  Good latch with comfort obtained  Reviewed expected changes in infant feeding patterns over the next few days, engorgement relief measures, signs of milk transfer, use of feeding log and when and where to call for additional assistance as needed  Given discharge  breastfeeding pkat and same reviewed

## 2020-03-22 NOTE — LACTATION NOTE
This note was copied from a baby's chart  Mom states infant is feeding well so far  Reviewed expected  infant feeding patterns in the first few days and encouraged feeding on cue  Given admission breastfeeding pkat and same reviewed

## 2020-03-22 NOTE — PLAN OF CARE
Problem: POSTPARTUM  Goal: Experiences normal postpartum course  Description  INTERVENTIONS:  - Monitor maternal vital signs  - Assess uterine involution and lochia  Outcome: Progressing  Goal: Appropriate maternal -  bonding  Description  INTERVENTIONS:  - Identify family support  - Assess for appropriate maternal/infant bonding   -Encourage maternal/infant bonding opportunities  - Referral to  or  as needed  Outcome: Progressing  Goal: Establishment of infant feeding pattern  Description  INTERVENTIONS:  - Assess breast/bottle feeding  - Refer to lactation as needed  Outcome: Progressing  Goal: Incision(s), wounds(s) or drain site(s) healing without S/S of infection  Description  INTERVENTIONS  - Assess and document risk factors for skin impairment   - Assess and document dressing, incision, wound bed, drain sites and surrounding tissue  - Consider nutrition services referral as needed  - Oral mucous membranes remain intact  - Provide patient/ family education  Outcome: Progressing     Problem: PAIN - ADULT  Goal: Verbalizes/displays adequate comfort level or baseline comfort level  Description  Interventions:  - Encourage patient to monitor pain and request assistance  - Assess pain using appropriate pain scale  - Administer analgesics based on type and severity of pain and evaluate response  - Implement non-pharmacological measures as appropriate and evaluate response  - Consider cultural and social influences on pain and pain management  - Notify physician/advanced practitioner if interventions unsuccessful or patient reports new pain  Outcome: Progressing     Problem: INFECTION - ADULT  Goal: Absence or prevention of progression during hospitalization  Description  INTERVENTIONS:  - Assess and monitor for signs and symptoms of infection  - Monitor lab/diagnostic results  - Monitor all insertion sites, i e  indwelling lines, tubes, and drains  - Monitor endotracheal if appropriate and nasal secretions for changes in amount and color  - [place] appropriate cooling/warming therapies per order  - Administer medications as ordered  - Instruct and encourage patient and family to use good hand hygiene technique  - Identify and instruct in appropriate isolation precautions for identified infection/condition  Outcome: Progressing  Goal: Absence of fever/infection during neutropenic period  Description  INTERVENTIONS:  - Monitor WBC    Outcome: Progressing     Problem: DISCHARGE PLANNING  Goal: Discharge to home or other facility with appropriate resources  Description  INTERVENTIONS:  - Identify barriers to discharge w/patient and caregiver  - Arrange for needed discharge resources and transportation as appropriate  - Identify discharge learning needs (meds, wound care, etc )  - Arrange for interpretive services to assist at discharge as needed  - Refer to Case Management Department for coordinating discharge planning if the patient needs post-hospital services based on physician/advanced practitioner order or complex needs related to functional status, cognitive ability, or social support system  Outcome: Progressing     Problem: Knowledge Deficit  Goal: Verbalizes understanding of labor plan  Description  Assess patient/family/caregiver's baseline knowledge level and ability to understand information  Provide education via patient/family/caregiver's preferred learning method at appropriate level of understanding  1  Provide teaching at level of understanding  2  Provide teaching via preferred learning method(s)  Outcome: Completed  Goal: Patient/family/caregiver demonstrates understanding of disease process, treatment plan, medications, and discharge instructions  Description  Complete learning assessment and assess knowledge base    Interventions:  - Provide teaching at level of understanding  - Provide teaching via preferred learning methods  Outcome: Completed     Problem: Labor & Delivery  Goal: Manages discomfort  Description  Assess and monitor for signs and symptoms of discomfort  Assess patient's pain level regularly and per hospital policy  Administer medications as ordered  Support use of nonpharmacological methods to help control pain such as distraction, imagery, relaxation, and application of heat and cold  Collaborate with interdisciplinary team and patient to determine appropriate pain management plan  1  Include patient in decisions related to comfort  2  Offer non-pharmacological pain management interventions  3  Report ineffective pain management to physician  Outcome: Completed  Goal: Patient vital signs are stable  Description  1  Assess vital signs - vaginal delivery    Outcome: Completed

## 2020-03-22 NOTE — PROGRESS NOTES
Progress Note - OB/GYN  Post-Partum Physician Note   Navi Daly 29 y o  female MRN: [id number]  Unit/Bed#:  305-01 Encounter: [id number]    Subjective/Objective   Chief Complaint: Postpartum    Subjective: Pain is controlled without any medications  Eating a regular diet without difficulty  Flatus Yes   Bowel movements are not yet  Voiding without difficulty  Ambulating Yes  Breastfeeding Yes  Lochia Lochia  Lochia Color: Rubra  Amount: Minimal  Lochia Odor: None  Clots: None normal     Objective:    Vitals: Blood pressure 94/50, pulse 85, temperature 97 9 °F (36 6 °C), temperature source Temporal, resp  rate 18, height 5' 3" (1 6 m), weight 80 3 kg (177 lb), last menstrual period 06/17/2019, SpO2 98 %, currently breastfeeding        Intake/Output Summary (Last 24 hours) at 3/22/2020 3941  Last data filed at 3/22/2020 0130  Gross per 24 hour   Intake --   Output 2084 ml   Net -2084 ml       Physical Exam:  GEN: Navi Daly appears well, alert and oriented x 3, pleasant and cooperative    ABDOMEN: normal bowel sounds, soft, no tenderness, no distention  : Uterus firm at umbilicus nontender u=1  EXTREMITIES: peripheral pulses normal; mild edema      Labs:   Recent Results (from the past 24 hour(s))   CBC and differential    Collection Time: 03/21/20  8:13 AM   Result Value Ref Range    WBC 7 51 4 31 - 10 16 Thousand/uL    RBC 3 55 (L) 3 81 - 5 12 Million/uL    Hemoglobin 9 9 (L) 11 5 - 15 4 g/dL    Hematocrit 31 0 (L) 34 8 - 46 1 %    MCV 87 82 - 98 fL    MCH 27 9 26 8 - 34 3 pg    MCHC 31 9 31 4 - 37 4 g/dL    RDW 15 8 (H) 11 6 - 15 1 %    MPV 9 6 8 9 - 12 7 fL    Platelets 861 (L) 905 - 390 Thousands/uL    nRBC 0 /100 WBCs    Neutrophils Relative 75 43 - 75 %    Immat GRANS % 1 0 - 2 %    Lymphocytes Relative 15 14 - 44 %    Monocytes Relative 9 4 - 12 %    Eosinophils Relative 0 0 - 6 %    Basophils Relative 0 0 - 1 %    Neutrophils Absolute 5 62 1 85 - 7 62 Thousands/µL    Immature Grans Absolute 0  07 0 00 - 0 20 Thousand/uL    Lymphocytes Absolute 1 14 0 60 - 4 47 Thousands/µL    Monocytes Absolute 0 64 0 17 - 1 22 Thousand/µL    Eosinophils Absolute 0 01 0 00 - 0 61 Thousand/µL    Basophils Absolute 0 03 0 00 - 0 10 Thousands/µL   Type and screen and continue to monitor patient    Collection Time: 03/21/20  8:13 AM   Result Value Ref Range    ABO Grouping A     Rh Factor Positive     Antibody Screen Negative     Specimen Expiration Date 20200324    Green Top 4 ml on hold    Collection Time: 03/21/20  8:13 AM   Result Value Ref Range    Extra Tube Hold for add-ons      Fingerstick Glucose (POCT)    Collection Time: 03/21/20  8:30 AM   Result Value Ref Range    POC Glucose 84 65 - 140 mg/dl   Fingerstick Glucose (POCT)    Collection Time: 03/21/20 10:41 AM   Result Value Ref Range    POC Glucose 65 65 - 140 mg/dl   Fingerstick Glucose (POCT)    Collection Time: 03/21/20 12:57 PM   Result Value Ref Range    POC Glucose 63 (L) 65 - 140 mg/dl   Fingerstick Glucose (POCT)    Collection Time: 03/21/20  3:00 PM   Result Value Ref Range    POC Glucose 110 65 - 140 mg/dl   Fingerstick Glucose (POCT)    Collection Time: 03/21/20  5:06 PM   Result Value Ref Range    POC Glucose 83 65 - 140 mg/dl   Blood gas, arterial, cord    Collection Time: 03/21/20  6:54 PM   Result Value Ref Range    pH, Cord Art 7 214 (L) 7 230 - 7 430    pCO2, Cord Art 57 8 30 0 - 60 0    pO2, Cord Art <10 0 5 0 - 25 0 mm HG    HCO3, Cord Art 22 8 17 3 - 27 3 mmol/L    Base Exc, Cord Art -5 8 (L) 3 0 - 11 0 mmol/L    O2 Content, Cord Art      O2 Hgb, Arterial Cord 8 1 %   Blood gas, venous, cord    Collection Time: 03/21/20  6:54 PM   Result Value Ref Range    pH, Cord Joselito 7 383 7 190 - 7 490    pCO2, Cord Joselito 34 2 27 0 - 43 0 mm HG    pO2, Cord Joselito 43 7 15 0 - 45 0 mm HG    HCO3, Cord Joselito 19 9 12 2 - 28 6 mmol/L    Base Exc, Cord Joselito -4 2 (L) 1 0 - 9 0 mmol/L    O2 Cont, Cord Joselito 19 2 mL/dL    O2 HGB,VENOUS CORD 87 7 %             MEDS: Current Facility-Administered Medications   Medication Dose Route Frequency    acetaminophen (TYLENOL) tablet 650 mg  650 mg Oral Q4H PRN    aluminum-magnesium hydroxide-simethicone (MYLANTA) 200-200-20 mg/5 mL oral suspension 15 mL  15 mL Oral Q6H PRN    benzocaine-menthol-lanolin-aloe (DERMOPLAST) 20-0 5 % topical spray   Topical 4x Daily PRN    calcium carbonate (TUMS) chewable tablet 1,000 mg  1,000 mg Oral Daily PRN    docusate sodium (COLACE) capsule 100 mg  100 mg Oral BID    hydrocortisone 1 % cream 1 application  1 application Topical PRN    ibuprofen (MOTRIN) tablet 600 mg  600 mg Oral Q6H PRN    iron sucrose (VENOFER) 200 mg in sodium chloride 0 9 % 100 mL IVPB  200 mg Intravenous Once    oxyCODONE (ROXICODONE) IR tablet 5 mg  5 mg Oral Q4H PRN    senna (SENOKOT) tablet 8 6 mg  1 tablet Oral Daily    simethicone (MYLICON) chewable tablet 80 mg  80 mg Oral 4x Daily PRN    witch hazel-glycerin (TUCKS) topical pad 1 pad  1 pad Topical PRN     Invasive Devices     Peripheral Intravenous Line            Peripheral IV 20 Right Hand less than 1 day                  Assessment/Plan     Assessment:  Patient Active Problem List   Diagnosis    Allergic rhinitis, seasonal    39 weeks gestation of pregnancy    Previous  delivery, antepartum    History of macrosomia in infant in prior pregnancy, currently pregnant    Family history of congenital malformation    Insulin controlled gestational diabetes mellitus (GDM) in third trimester    Abnormal biochemical finding on  screening of mother   Alec Selby Multigravida in third trimester    Vaginal birth after  delivery       Plan:  1) Postpartum day #  1 status post   2) Continue routine postpartum care    3) reviewed with patient venofer times one  4) d/c home this evening if baby cleared    Adriana Miller MD  3/22/2020  6:24 AM

## 2020-03-22 NOTE — PLAN OF CARE
Problem: POSTPARTUM  Goal: Experiences normal postpartum course  Description  INTERVENTIONS:  - Monitor maternal vital signs  - Assess uterine involution and lochia  Outcome: Progressing  Goal: Appropriate maternal -  bonding  Description  INTERVENTIONS:  - Identify family support  - Assess for appropriate maternal/infant bonding   -Encourage maternal/infant bonding opportunities  - Referral to  or  as needed  Outcome: Progressing  Goal: Establishment of infant feeding pattern  Description  INTERVENTIONS:  - Assess breast/bottle feeding  - Refer to lactation as needed  Outcome: Progressing  Goal: Incision(s), wounds(s) or drain site(s) healing without S/S of infection  Description  INTERVENTIONS  - Assess and document risk factors for skin impairment   - Assess and document dressing, incision, wound bed, drain sites and surrounding tissue  - Consider nutrition services referral as needed  - Oral mucous membranes remain intact  - Provide patient/ family education  Outcome: Progressing     Problem: PAIN - ADULT  Goal: Verbalizes/displays adequate comfort level or baseline comfort level  Description  Interventions:  - Encourage patient to monitor pain and request assistance  - Assess pain using appropriate pain scale  - Administer analgesics based on type and severity of pain and evaluate response  - Implement non-pharmacological measures as appropriate and evaluate response  - Consider cultural and social influences on pain and pain management  - Notify physician/advanced practitioner if interventions unsuccessful or patient reports new pain  Outcome: Progressing     Problem: INFECTION - ADULT  Goal: Absence or prevention of progression during hospitalization  Description  INTERVENTIONS:  - Assess and monitor for signs and symptoms of infection  - Monitor lab/diagnostic results  - Monitor all insertion sites, i e  indwelling lines, tubes, and drains  - Monitor endotracheal if appropriate and nasal secretions for changes in amount and color  - [place] appropriate cooling/warming therapies per order  - Administer medications as ordered  - Instruct and encourage patient and family to use good hand hygiene technique  - Identify and instruct in appropriate isolation precautions for identified infection/condition  Outcome: Progressing  Goal: Absence of fever/infection during neutropenic period  Description  INTERVENTIONS:  - Monitor WBC    Outcome: Progressing     Problem: DISCHARGE PLANNING  Goal: Discharge to home or other facility with appropriate resources  Description  INTERVENTIONS:  - Identify barriers to discharge w/patient and caregiver  - Arrange for needed discharge resources and transportation as appropriate  - Identify discharge learning needs (meds, wound care, etc )  - Arrange for interpretive services to assist at discharge as needed  - Refer to Case Management Department for coordinating discharge planning if the patient needs post-hospital services based on physician/advanced practitioner order or complex needs related to functional status, cognitive ability, or social support system  Outcome: Progressing

## 2020-03-22 NOTE — ANESTHESIA POSTPROCEDURE EVALUATION
Post-Op Assessment Note    CV Status:  Stable    Pain management: adequate     Mental Status:  Alert and awake   Hydration Status:  Euvolemic   PONV Controlled:  Controlled   Airway Patency:  Patent   Post Op Vitals Reviewed: Yes        Post-op block assessment: site cleaned, catheter intact and no complications        BP      Temp     Pulse     Resp      SpO2

## 2020-03-22 NOTE — PLAN OF CARE
Problem: POSTPARTUM  Goal: Experiences normal postpartum course  Description  INTERVENTIONS:  - Monitor maternal vital signs  - Assess uterine involution and lochia  3/22/2020 153 by Yessi Solomon RN  Outcome: Completed  3/22/2020 1236 by Yessi Solomon RN  Outcome: Progressing  Goal: Appropriate maternal -  bonding  Description  INTERVENTIONS:  - Identify family support  - Assess for appropriate maternal/infant bonding   -Encourage maternal/infant bonding opportunities  - Referral to  or  as needed  3/22/2020 1537 by Yessi Solomon RN  Outcome: Completed  3/22/2020 1236 by Yessi Solomon RN  Outcome: Progressing  Goal: Establishment of infant feeding pattern  Description  INTERVENTIONS:  - Assess breast/bottle feeding  - Refer to lactation as needed  3/22/2020 1537 by Yessi Solomon RN  Outcome: Completed  3/22/2020 123 by Yessi Solomon RN  Outcome: Progressing  Goal: Incision(s), wounds(s) or drain site(s) healing without S/S of infection  Description  INTERVENTIONS  - Assess and document risk factors for skin impairment   - Assess and document dressing, incision, wound bed, drain sites and surrounding tissue  - Consider nutrition services referral as needed  - Oral mucous membranes remain intact  - Provide patient/ family education  3/22/2020 153 by Yessi Solomon RN  Outcome: Completed  3/22/2020 1236 by Yessi Solomon RN  Outcome: Progressing     Problem: PAIN - ADULT  Goal: Verbalizes/displays adequate comfort level or baseline comfort level  Description  Interventions:  - Encourage patient to monitor pain and request assistance  - Assess pain using appropriate pain scale  - Administer analgesics based on type and severity of pain and evaluate response  - Implement non-pharmacological measures as appropriate and evaluate response  - Consider cultural and social influences on pain and pain management  - Notify physician/advanced practitioner if interventions unsuccessful or patient reports new pain  3/22/2020 1537 by Daly Alvarado RN  Outcome: Completed  3/22/2020 1236 by Daly Alvarado RN  Outcome: Progressing     Problem: INFECTION - ADULT  Goal: Absence or prevention of progression during hospitalization  Description  INTERVENTIONS:  - Assess and monitor for signs and symptoms of infection  - Monitor lab/diagnostic results  - Monitor all insertion sites, i e  indwelling lines, tubes, and drains  - Monitor endotracheal if appropriate and nasal secretions for changes in amount and color  - [place] appropriate cooling/warming therapies per order  - Administer medications as ordered  - Instruct and encourage patient and family to use good hand hygiene technique  - Identify and instruct in appropriate isolation precautions for identified infection/condition  3/22/2020 1537 by Daly Alvarado RN  Outcome: Completed  3/22/2020 1236 by Daly Alvarado RN  Outcome: Progressing  Goal: Absence of fever/infection during neutropenic period  Description  INTERVENTIONS:  - Monitor WBC    3/22/2020 1537 by Daly Alvarado RN  Outcome: Completed  3/22/2020 1236 by Daly Alvarado RN  Outcome: Progressing     Problem: DISCHARGE PLANNING  Goal: Discharge to home or other facility with appropriate resources  Description  INTERVENTIONS:  - Identify barriers to discharge w/patient and caregiver  - Arrange for needed discharge resources and transportation as appropriate  - Identify discharge learning needs (meds, wound care, etc )  - Arrange for interpretive services to assist at discharge as needed  - Refer to Case Management Department for coordinating discharge planning if the patient needs post-hospital services based on physician/advanced practitioner order or complex needs related to functional status, cognitive ability, or social support system  3/22/2020 1537 by Daly Alvarado RN  Outcome: Completed  3/22/2020 1236 by Daly Alvarado RN  Outcome: Progressing

## 2020-03-22 NOTE — PLAN OF CARE
Problem: POSTPARTUM  Goal: Experiences normal postpartum course  Description  INTERVENTIONS:  - Monitor maternal vital signs  - Assess uterine involution and lochia  Outcome: Progressing  Goal: Appropriate maternal -  bonding  Description  INTERVENTIONS:  - Identify family support  - Assess for appropriate maternal/infant bonding   -Encourage maternal/infant bonding opportunities  - Referral to  or  as needed  Outcome: Progressing  Goal: Establishment of infant feeding pattern  Description  INTERVENTIONS:  - Assess breast/bottle feeding  - Refer to lactation as needed  Outcome: Progressing  Goal: Incision(s), wounds(s) or drain site(s) healing without S/S of infection  Description  INTERVENTIONS  - Assess and document risk factors for skin impairment   - Assess and document dressing, incision, wound bed, drain sites and surrounding tissue  - Consider nutrition services referral as needed  - Oral mucous membranes remain intact  - Provide patient/ family education  Outcome: Progressing     Problem: PAIN - ADULT  Goal: Verbalizes/displays adequate comfort level or baseline comfort level  Description  Interventions:  - Encourage patient to monitor pain and request assistance  - Assess pain using appropriate pain scale  - Administer analgesics based on type and severity of pain and evaluate response  - Implement non-pharmacological measures as appropriate and evaluate response  - Consider cultural and social influences on pain and pain management  - Notify physician/advanced practitioner if interventions unsuccessful or patient reports new pain  Outcome: Progressing     Problem: INFECTION - ADULT  Goal: Absence or prevention of progression during hospitalization  Description  INTERVENTIONS:  - Assess and monitor for signs and symptoms of infection  - Monitor lab/diagnostic results  - Monitor all insertion sites, i e  indwelling lines, tubes, and drains  - Monitor endotracheal if appropriate and nasal secretions for changes in amount and color  - [place] appropriate cooling/warming therapies per order  - Administer medications as ordered  - Instruct and encourage patient and family to use good hand hygiene technique  - Identify and instruct in appropriate isolation precautions for identified infection/condition  Outcome: Progressing  Goal: Absence of fever/infection during neutropenic period  Description  INTERVENTIONS:  - Monitor WBC    Outcome: Progressing     Problem: DISCHARGE PLANNING  Goal: Discharge to home or other facility with appropriate resources  Description  INTERVENTIONS:  - Identify barriers to discharge w/patient and caregiver  - Arrange for needed discharge resources and transportation as appropriate  - Identify discharge learning needs (meds, wound care, etc )  - Arrange for interpretive services to assist at discharge as needed  - Refer to Case Management Department for coordinating discharge planning if the patient needs post-hospital services based on physician/advanced practitioner order or complex needs related to functional status, cognitive ability, or social support system  Outcome: Progressing

## 2020-03-23 LAB — RPR SER QL: NORMAL

## 2020-03-28 LAB — PLACENTA IN STORAGE: NORMAL

## 2020-04-13 ENCOUNTER — TELEMEDICINE (OUTPATIENT)
Dept: OBGYN CLINIC | Facility: CLINIC | Age: 35
End: 2020-04-13

## 2020-04-13 DIAGNOSIS — Z86.32 HISTORY OF GESTATIONAL DIABETES: ICD-10-CM

## 2020-04-13 PROBLEM — Z3A.39 39 WEEKS GESTATION OF PREGNANCY: Status: RESOLVED | Noted: 2020-01-29 | Resolved: 2020-04-13

## 2020-04-13 PROBLEM — O34.219 PREVIOUS CESAREAN DELIVERY, ANTEPARTUM: Status: RESOLVED | Noted: 2017-07-17 | Resolved: 2020-04-13

## 2020-04-13 PROBLEM — O09.299 HISTORY OF MACROSOMIA IN INFANT IN PRIOR PREGNANCY, CURRENTLY PREGNANT: Status: RESOLVED | Noted: 2017-07-17 | Resolved: 2020-04-13

## 2020-04-13 PROBLEM — Z34.83 MULTIGRAVIDA IN THIRD TRIMESTER: Status: RESOLVED | Noted: 2020-03-03 | Resolved: 2020-04-13

## 2020-04-13 PROBLEM — O28.1 ABNORMAL BIOCHEMICAL FINDING ON ANTENATAL SCREENING OF MOTHER: Status: RESOLVED | Noted: 2019-11-04 | Resolved: 2020-04-13

## 2020-04-13 PROBLEM — O24.414 INSULIN CONTROLLED GESTATIONAL DIABETES MELLITUS (GDM) IN THIRD TRIMESTER: Status: RESOLVED | Noted: 2020-01-17 | Resolved: 2020-04-13

## 2020-04-13 PROCEDURE — 99024 POSTOP FOLLOW-UP VISIT: CPT | Performed by: NURSE PRACTITIONER

## 2021-11-02 ENCOUNTER — IMMUNIZATIONS (OUTPATIENT)
Dept: FAMILY MEDICINE CLINIC | Facility: HOSPITAL | Age: 36
End: 2021-11-02

## 2021-11-02 DIAGNOSIS — Z23 ENCOUNTER FOR IMMUNIZATION: Primary | ICD-10-CM

## 2021-11-24 ENCOUNTER — OFFICE VISIT (OUTPATIENT)
Dept: FAMILY MEDICINE CLINIC | Facility: CLINIC | Age: 36
End: 2021-11-24
Payer: COMMERCIAL

## 2021-11-24 VITALS
SYSTOLIC BLOOD PRESSURE: 122 MMHG | OXYGEN SATURATION: 97 % | BODY MASS INDEX: 28.53 KG/M2 | WEIGHT: 161 LBS | HEART RATE: 96 BPM | TEMPERATURE: 97.1 F | RESPIRATION RATE: 18 BRPM | HEIGHT: 63 IN | DIASTOLIC BLOOD PRESSURE: 80 MMHG

## 2021-11-24 DIAGNOSIS — E01.0 THYROMEGALY: ICD-10-CM

## 2021-11-24 DIAGNOSIS — E55.9 VITAMIN D DEFICIENCY: ICD-10-CM

## 2021-11-24 DIAGNOSIS — Z76.89 ENCOUNTER TO ESTABLISH CARE: ICD-10-CM

## 2021-11-24 DIAGNOSIS — Z13.29 SCREENING FOR THYROID DISORDER: ICD-10-CM

## 2021-11-24 DIAGNOSIS — Z13.220 SCREENING FOR LIPID DISORDERS: ICD-10-CM

## 2021-11-24 DIAGNOSIS — R63.5 WEIGHT GAIN: Primary | ICD-10-CM

## 2021-11-24 DIAGNOSIS — D50.9 IRON DEFICIENCY ANEMIA, UNSPECIFIED IRON DEFICIENCY ANEMIA TYPE: ICD-10-CM

## 2021-11-24 PROCEDURE — 1036F TOBACCO NON-USER: CPT | Performed by: NURSE PRACTITIONER

## 2021-11-24 PROCEDURE — 3008F BODY MASS INDEX DOCD: CPT | Performed by: NURSE PRACTITIONER

## 2021-11-24 PROCEDURE — 3725F SCREEN DEPRESSION PERFORMED: CPT | Performed by: NURSE PRACTITIONER

## 2021-11-24 PROCEDURE — 99204 OFFICE O/P NEW MOD 45 MIN: CPT | Performed by: NURSE PRACTITIONER

## 2021-11-24 PROCEDURE — 36415 COLL VENOUS BLD VENIPUNCTURE: CPT | Performed by: NURSE PRACTITIONER

## 2021-11-24 RX ORDER — VITAMIN A ACETATE, BETA CAROTENE, ASCORBIC ACID, CHOLECALCIFEROL, .ALPHA.-TOCOPHEROL ACETATE, DL-, THIAMINE MONONITRATE, RIBOFLAVIN, NIACINAMIDE, PYRIDOXINE HYDROCHLORIDE, FOLIC ACID, CYANOCOBALAMIN, CALCIUM CARBONATE, FERROUS FUMARATE, ZINC OXIDE, CUPRIC OXIDE 3080; 12; 120; 400; 1; 1.84; 3; 20; 22; 920; 25; 200; 27; 10; 2 [IU]/1; UG/1; MG/1; [IU]/1; MG/1; MG/1; MG/1; MG/1; MG/1; [IU]/1; MG/1; MG/1; MG/1; MG/1; MG/1
1 TABLET, FILM COATED ORAL DAILY
COMMUNITY
End: 2022-07-28 | Stop reason: HOSPADM

## 2021-11-25 LAB
25(OH)D3+25(OH)D2 SERPL-MCNC: 37 NG/ML (ref 30–100)
ALBUMIN SERPL-MCNC: 4.9 G/DL (ref 3.8–4.8)
ALBUMIN/GLOB SERPL: 2.2 {RATIO} (ref 1.2–2.2)
ALP SERPL-CCNC: 66 IU/L (ref 44–121)
ALT SERPL-CCNC: 13 IU/L (ref 0–32)
AST SERPL-CCNC: 19 IU/L (ref 0–40)
BASOPHILS # BLD AUTO: 0 X10E3/UL (ref 0–0.2)
BASOPHILS NFR BLD AUTO: 1 %
BILIRUB SERPL-MCNC: 1.4 MG/DL (ref 0–1.2)
BUN SERPL-MCNC: 14 MG/DL (ref 6–20)
BUN/CREAT SERPL: 16 (ref 9–23)
CALCIUM SERPL-MCNC: 9.7 MG/DL (ref 8.7–10.2)
CHLORIDE SERPL-SCNC: 103 MMOL/L (ref 96–106)
CHOLEST SERPL-MCNC: 200 MG/DL (ref 100–199)
CHOLEST/HDLC SERPL: 4.4 RATIO (ref 0–4.4)
CO2 SERPL-SCNC: 22 MMOL/L (ref 20–29)
CREAT SERPL-MCNC: 0.89 MG/DL (ref 0.57–1)
EOSINOPHIL # BLD AUTO: 0 X10E3/UL (ref 0–0.4)
EOSINOPHIL NFR BLD AUTO: 1 %
ERYTHROCYTE [DISTWIDTH] IN BLOOD BY AUTOMATED COUNT: 12.7 % (ref 11.7–15.4)
EST. AVERAGE GLUCOSE BLD GHB EST-MCNC: 108 MG/DL
FERRITIN SERPL-MCNC: 41 NG/ML (ref 15–150)
GLOBULIN SER-MCNC: 2.2 G/DL (ref 1.5–4.5)
GLUCOSE SERPL-MCNC: 91 MG/DL (ref 65–99)
HBA1C MFR BLD: 5.4 % (ref 4.8–5.6)
HCT VFR BLD AUTO: 40.9 % (ref 34–46.6)
HDLC SERPL-MCNC: 45 MG/DL
HGB BLD-MCNC: 14 G/DL (ref 11.1–15.9)
IMM GRANULOCYTES # BLD: 0 X10E3/UL (ref 0–0.1)
IMM GRANULOCYTES NFR BLD: 0 %
IRON SERPL-MCNC: 85 UG/DL (ref 27–159)
LDLC SERPL CALC-MCNC: 131 MG/DL (ref 0–99)
LYMPHOCYTES # BLD AUTO: 1.3 X10E3/UL (ref 0.7–3.1)
LYMPHOCYTES NFR BLD AUTO: 25 %
MCH RBC QN AUTO: 30.5 PG (ref 26.6–33)
MCHC RBC AUTO-ENTMCNC: 34.2 G/DL (ref 31.5–35.7)
MCV RBC AUTO: 89 FL (ref 79–97)
MONOCYTES # BLD AUTO: 0.4 X10E3/UL (ref 0.1–0.9)
MONOCYTES NFR BLD AUTO: 7 %
NEUTROPHILS # BLD AUTO: 3.4 X10E3/UL (ref 1.4–7)
NEUTROPHILS NFR BLD AUTO: 66 %
PLATELET # BLD AUTO: 232 X10E3/UL (ref 150–450)
POTASSIUM SERPL-SCNC: 4.4 MMOL/L (ref 3.5–5.2)
PROT SERPL-MCNC: 7.1 G/DL (ref 6–8.5)
RBC # BLD AUTO: 4.59 X10E6/UL (ref 3.77–5.28)
SL AMB EGFR AFRICAN AMERICAN: 96 ML/MIN/1.73
SL AMB EGFR NON AFRICAN AMERICAN: 84 ML/MIN/1.73
SL AMB VLDL CHOLESTEROL CALC: 24 MG/DL (ref 5–40)
SODIUM SERPL-SCNC: 139 MMOL/L (ref 134–144)
TRIGL SERPL-MCNC: 131 MG/DL (ref 0–149)
TSH SERPL DL<=0.005 MIU/L-ACNC: 4.79 UIU/ML (ref 0.45–4.5)
WBC # BLD AUTO: 5.2 X10E3/UL (ref 3.4–10.8)

## 2021-11-30 ENCOUNTER — HOSPITAL ENCOUNTER (OUTPATIENT)
Dept: RADIOLOGY | Facility: HOSPITAL | Age: 36
Discharge: HOME | End: 2021-11-30
Payer: COMMERCIAL

## 2021-11-30 DIAGNOSIS — E01.0 THYROMEGALY: ICD-10-CM

## 2021-11-30 LAB
T3FREE SERPL-MCNC: 2.9 PG/ML (ref 2–4.4)
T4 FREE SERPL-MCNC: 1.14 NG/DL (ref 0.82–1.77)

## 2021-11-30 PROCEDURE — 76536 US EXAM OF HEAD AND NECK: CPT

## 2021-12-09 ENCOUNTER — TELEMEDICINE (OUTPATIENT)
Dept: FAMILY MEDICINE CLINIC | Facility: CLINIC | Age: 36
End: 2021-12-09
Payer: COMMERCIAL

## 2021-12-09 VITALS — WEIGHT: 161 LBS | HEIGHT: 63 IN | BODY MASS INDEX: 28.53 KG/M2

## 2021-12-09 DIAGNOSIS — E78.2 MIXED HYPERLIPIDEMIA: ICD-10-CM

## 2021-12-09 DIAGNOSIS — E03.8 SUBCLINICAL HYPOTHYROIDISM: Primary | ICD-10-CM

## 2021-12-09 PROCEDURE — 1036F TOBACCO NON-USER: CPT | Performed by: NURSE PRACTITIONER

## 2021-12-09 PROCEDURE — 3008F BODY MASS INDEX DOCD: CPT | Performed by: NURSE PRACTITIONER

## 2021-12-09 PROCEDURE — 99213 OFFICE O/P EST LOW 20 MIN: CPT | Performed by: NURSE PRACTITIONER

## 2021-12-09 RX ORDER — LEVOTHYROXINE SODIUM 0.03 MG/1
12.5 TABLET ORAL DAILY
Qty: 30 TABLET | Refills: 0 | Status: SHIPPED | OUTPATIENT
Start: 2021-12-09 | End: 2022-01-27 | Stop reason: SDUPTHER

## 2022-01-20 ENCOUNTER — TELEMEDICINE (OUTPATIENT)
Dept: FAMILY MEDICINE CLINIC | Facility: CLINIC | Age: 37
End: 2022-01-20
Payer: COMMERCIAL

## 2022-01-20 VITALS — HEIGHT: 63 IN | TEMPERATURE: 98.4 F | BODY MASS INDEX: 28.53 KG/M2 | WEIGHT: 161 LBS

## 2022-01-20 DIAGNOSIS — B34.9 VIRAL ILLNESS: Primary | ICD-10-CM

## 2022-01-20 DIAGNOSIS — R05.8 COUGH WITH EXPOSURE TO COVID-19 VIRUS: ICD-10-CM

## 2022-01-20 DIAGNOSIS — Z20.822 COUGH WITH EXPOSURE TO COVID-19 VIRUS: ICD-10-CM

## 2022-01-20 PROCEDURE — 99213 OFFICE O/P EST LOW 20 MIN: CPT | Performed by: NURSE PRACTITIONER

## 2022-01-20 PROCEDURE — 3008F BODY MASS INDEX DOCD: CPT | Performed by: NURSE PRACTITIONER

## 2022-01-20 PROCEDURE — U0005 INFEC AGEN DETEC AMPLI PROBE: HCPCS | Performed by: NURSE PRACTITIONER

## 2022-01-20 PROCEDURE — 1036F TOBACCO NON-USER: CPT | Performed by: NURSE PRACTITIONER

## 2022-01-20 PROCEDURE — U0003 INFECTIOUS AGENT DETECTION BY NUCLEIC ACID (DNA OR RNA); SEVERE ACUTE RESPIRATORY SYNDROME CORONAVIRUS 2 (SARS-COV-2) (CORONAVIRUS DISEASE [COVID-19]), AMPLIFIED PROBE TECHNIQUE, MAKING USE OF HIGH THROUGHPUT TECHNOLOGIES AS DESCRIBED BY CMS-2020-01-R: HCPCS | Performed by: NURSE PRACTITIONER

## 2022-01-20 NOTE — PROGRESS NOTES
COVID-19 Outpatient Progress Note    Assessment/Plan:  1  Viral illness  - COVID Only - Office Collect  2  Cough with exposure to COVID-19 virus  - COVID Only - Office Collect    Refer to [employer] for specific information, guidelines,  and answers to many frequently asked questions  You are being tested for Covid19  If Covid-19 test is positive, self isolation for 5 days from onset of symptoms and 24 hours with no symptoms and no fever without use of anti-pyrectic medications (ie: Tylenol, Ibuprofen, Advil, etc ) before discontinuing self isolation and/or return to work or normal activities for fully vaccinated individuals  Masking is recommended for an additional 5 days after isolation period ends  While in self isolation you should be using a private bathroom, sleeping area, mask in the home if any other people are present, no shared eating utensils, etc    Deep breathing exercises to expand lung fields  Monitor temperature daily or if you feel you have a fever  Tylenol or Advil/Ibuprofen is appropriate for fever control  Check pulse oximetry,  if able,  daily and if you are having any increased shortness of breath or chest tightness  Call the office if you are having any increased respiratory symptoms or if pulse oximetry is persistently less than 92%    Current recommendations for treatment include daily multivitamin, vitamin C, zinc, and vitamin D supplementation    CDC emergency warning signs for when to seek medical attention immediately:  - difficulty breathing or shortness of breath  - persistent pain or pressure in the chest   - new confusion or inability to arouse  - bluish lips or face   If you should need to seek medical care, you should notify the ED or EMS that you are under investigation and/or positive for COVID-19 prior and wear a facemask if possible, scarf or bandana     Problem List Items Addressed This Visit     None      Visit Diagnoses     Viral illness    -  Primary Relevant Orders    COVID Only - Office Collect    Cough with exposure to COVID-19 virus        Relevant Orders    COVID Only - Office Collect         Disposition:     Recommended patient to come to the office to test for COVID-19  I have spent 15 minutes directly with the patient  Greater than 50% of this time was spent in counseling/coordination of care regarding: prognosis, risks and benefits of treatment options, instructions for management, patient and family education, importance of treatment compliance, risk factor reductions and impressions  Encounter provider TONY Cruz    Provider located at [place] [address]  [phone] [facility] [id number]    Recent Visits  No visits were found meeting these conditions  Showing recent visits within past 7 days and meeting all other requirements  Today's Visits  Date Type Provider Dept   01/20/22 Telemedicine TONY Cruz  Emily Pires   Showing today's visits and meeting all other requirements  Future Appointments  No visits were found meeting these conditions  Showing future appointments within next 150 days and meeting all other requirements     This virtual check-in was done via [employer] and patient was informed that this is a secure, HIPAA-compliant platform  She agrees to proceed  Patient agrees to participate in a virtual check in via telephone or video visit instead of presenting to the office to address urgent/immediate medical needs  Patient is aware this is a billable service  After connecting through [place], the patient was identified by name and date of birth  Fern Monique was informed that this was a telemedicine visit and that the exam was being conducted confidentially over secure lines  My office door was closed  No one else was in the room  Fern Monique acknowledged consent and understanding of privacy and security of the telemedicine visit   I informed the patient that I have reviewed her record in Epic and presented the opportunity for her to ask any questions regarding the visit today  The patient agreed to participate  Verification of patient location:  Patient is located in the following state in which I hold an active license: [place]    Subjective:   Jeffrey Rodrigez is a 39 y o  female who is concerned about COVID-19  Patient's symptoms include fever (low grade), chills, fatigue, malaise, nasal congestion, sore throat, cough, myalgias and headache  Patient denies rhinorrhea, anosmia, loss of taste, shortness of breath, chest tightness, abdominal pain, nausea, vomiting and diarrhea  - Date of symptom onset: 2022      COVID-19 vaccination status: Fully vaccinated with booster    Exposure:   Contact with a person who is under investigation (PUI) for or who is positive for COVID-19 within the last 14 days?: No    Hospitalized recently for fever and/or lower respiratory symptoms?: No      Currently a healthcare worker that is involved in direct patient care?: No      Works in a special setting where the risk of COVID-19 transmission may be high? (this may include long-term care, correctional and assisted facilities; homeless shelters; assisted-living facilities and group homes ): No      Resident in a special setting where the risk of COVID-19 transmission may be high? (this may include long-term care, correctional and assisted facilities; homeless shelters; assisted-living facilities and group homes ): No      Did home test and was negative  Children were exposed at school and all had viral illness last week  Not tested  No difficulty breathing         No results found for: Oliver Ek, SARSCORONAVI, CORONAVIRUSR, [address] [place]  Past Medical History:   Diagnosis Date    Anemia     hx of    Diabetes mellitus ([facility] [address] )     With this pregnancy GDM on insulin    Seasonal allergies     Spontaneous      RESOLVED: [id number]    Threatened      LAST ASSESSED: [id number]    Varicella     had as child and received vaccine per pt immune    Visual impairment     eyewear      Past Surgical History:   Procedure Laterality Date     SECTION  2013    LOW TRANSVERSE    KNEE ARTHROSCOPY W/ MENISCAL REPAIR Right 2003    MEDIAL    KNEE CARTILAGE SURGERY Right          Current Outpatient Medications   Medication Sig Dispense Refill    levothyroxine (Synthroid) 25 mcg tablet Take 0 5 tablets (12 5 mcg total) by mouth daily 30 tablet 0    Prenat w/o A-FeCbGl-DSS-FA-DHA (PNV OB+DHA PO) Take 1 tablet by mouth daily       Prenatal Vit-Fe Fumarate-FA (Prenatal Plus) 27-1 MG TABS Take 1 tablet by mouth daily (Patient not taking: Reported on 2021 )       No current facility-administered medications for this visit  Allergies   Allergen Reactions    Kiwi Extract - Food Allergy Rash and Swelling     Mouth irritation  Review of Systems   Constitutional: Positive for chills, fatigue and fever (low grade)  HENT: Positive for congestion and sore throat  Negative for rhinorrhea  Respiratory: Positive for cough  Negative for chest tightness and shortness of breath  Gastrointestinal: Negative for abdominal pain, diarrhea, nausea and vomiting  Musculoskeletal: Positive for myalgias  Neurological: Positive for headaches  Objective:    Vitals:    22 1124   Temp: 98 4 °F (36 9 °C)   TempSrc: Tympanic   Weight: 73 kg (161 lb)   Height: 5' 3" (1 6 m)       Physical Exam  Constitutional:       Appearance: She is ill-appearing  Eyes:      General: No scleral icterus  Pulmonary:      Effort: Pulmonary effort is normal  No respiratory distress  Comments: No conversational dyspnea  Skin:     Coloration: Skin is not jaundiced or pale  Neurological:      Mental Status: She is alert and oriented to person, place, and time     Psychiatric:         Mood and Affect: Mood normal          Behavior: Behavior normal  Thought Content: Thought content normal          Judgment: Judgment normal          VIRTUAL VISIT DISCLAIMER    Ranjeet Aburto verbally agrees to participate in [employer]  Pt is aware that [employer] could be limited without vital signs or the ability to perform a full hands-on physical Arley Jarquin understands she or the provider may request at any time to terminate the video visit and request the patient to seek care or treatment in person

## 2022-01-20 NOTE — PATIENT INSTRUCTIONS
Refer to [employer]  [place]  gov for specific information, guidelines,  and answers to many frequently asked questions  You are being tested for Covid19  If Covid-19 test is positive, self isolation for 5 days from onset of symptoms and 24 hours with no symptoms and no fever without use of anti-pyrectic medications (ie: Tylenol, Ibuprofen, Advil, etc ) before discontinuing self isolation and/or return to work or normal activities for fully vaccinated individuals  Masking is recommended for an additional 5 days after isolation period ends  While in self isolation you should be using a private bathroom, sleeping area, mask in the home if any other people are present, no shared eating utensils, etc    Deep breathing exercises to expand lung fields  Monitor temperature daily or if you feel you have a fever  Tylenol or Advil/Ibuprofen is appropriate for fever control  Check pulse oximetry,  if able,  daily and if you are having any increased shortness of breath or chest tightness  Call the office if you are having any increased respiratory symptoms or if pulse oximetry is persistently less than 92%    Current recommendations for treatment include daily multivitamin, vitamin C, zinc, and vitamin D supplementation    CDC emergency warning signs for when to seek medical attention immediately:  - difficulty breathing or shortness of breath  - persistent pain or pressure in the chest   - new confusion or inability to arouse  - bluish lips or face   If you should need to seek medical care, you should notify the ED or EMS that you are under investigation and/or positive for COVID-19 prior and wear a facemask if possible, scarf or bandana

## 2022-01-21 ENCOUNTER — CLINICAL SUPPORT (OUTPATIENT)
Dept: FAMILY MEDICINE CLINIC | Facility: CLINIC | Age: 37
End: 2022-01-21

## 2022-01-21 DIAGNOSIS — B34.9 VIRAL ILLNESS: Primary | ICD-10-CM

## 2022-01-21 LAB — SARS-COV-2 RNA RESP QL NAA+PROBE: NEGATIVE

## 2022-01-21 PROCEDURE — 87636 SARSCOV2 & INF A&B AMP PRB: CPT | Performed by: NURSE PRACTITIONER

## 2022-01-22 LAB
FLUAV RNA RESP QL NAA+PROBE: NEGATIVE
FLUBV RNA RESP QL NAA+PROBE: NEGATIVE
SARS-COV-2 RNA RESP QL NAA+PROBE: NEGATIVE

## 2022-01-26 ENCOUNTER — APPOINTMENT (OUTPATIENT)
Dept: LAB | Facility: HOSPITAL | Age: 37
End: 2022-01-26
Payer: COMMERCIAL

## 2022-01-26 DIAGNOSIS — E03.8 SUBCLINICAL HYPOTHYROIDISM: Primary | ICD-10-CM

## 2022-01-26 LAB — TSH SERPL DL<=0.05 MIU/L-ACNC: 1.67 UIU/ML (ref 0.36–3.74)

## 2022-01-26 PROCEDURE — 86800 THYROGLOBULIN ANTIBODY: CPT

## 2022-01-26 PROCEDURE — 84443 ASSAY THYROID STIM HORMONE: CPT

## 2022-01-26 PROCEDURE — 86376 MICROSOMAL ANTIBODY EACH: CPT

## 2022-01-26 PROCEDURE — 36415 COLL VENOUS BLD VENIPUNCTURE: CPT

## 2022-01-27 DIAGNOSIS — E03.8 SUBCLINICAL HYPOTHYROIDISM: ICD-10-CM

## 2022-01-27 LAB
THYROGLOB AB SERPL-ACNC: <1 IU/ML (ref 0–0.9)
THYROPEROXIDASE AB SERPL-ACNC: <8 IU/ML (ref 0–34)

## 2022-01-28 RX ORDER — LEVOTHYROXINE SODIUM 0.03 MG/1
12.5 TABLET ORAL DAILY
Qty: 90 TABLET | Refills: 1 | Status: SHIPPED | OUTPATIENT
Start: 2022-01-28 | End: 2022-07-28 | Stop reason: HOSPADM

## 2022-07-28 ENCOUNTER — APPOINTMENT (OUTPATIENT)
Dept: RADIOLOGY | Facility: CLINIC | Age: 37
End: 2022-07-28
Payer: COMMERCIAL

## 2022-07-28 ENCOUNTER — OFFICE VISIT (OUTPATIENT)
Dept: FAMILY MEDICINE CLINIC | Facility: CLINIC | Age: 37
End: 2022-07-28
Payer: COMMERCIAL

## 2022-07-28 VITALS
DIASTOLIC BLOOD PRESSURE: 60 MMHG | HEIGHT: 63 IN | WEIGHT: 159 LBS | HEART RATE: 101 BPM | OXYGEN SATURATION: 100 % | TEMPERATURE: 97.3 F | SYSTOLIC BLOOD PRESSURE: 100 MMHG | BODY MASS INDEX: 28.17 KG/M2

## 2022-07-28 DIAGNOSIS — R07.89 CHEST TIGHTNESS: Primary | ICD-10-CM

## 2022-07-28 DIAGNOSIS — R07.89 CHEST TIGHTNESS: ICD-10-CM

## 2022-07-28 LAB — ECG INTERP DURING EX: NORMAL MS

## 2022-07-28 PROCEDURE — 71046 X-RAY EXAM CHEST 2 VIEWS: CPT

## 2022-07-28 PROCEDURE — 93000 ELECTROCARDIOGRAM COMPLETE: CPT | Performed by: NURSE PRACTITIONER

## 2022-07-28 PROCEDURE — 99214 OFFICE O/P EST MOD 30 MIN: CPT | Performed by: NURSE PRACTITIONER

## 2022-07-28 NOTE — PATIENT INSTRUCTIONS
Chest tightness most likely stress related  EKG done in office today was normal  Will check chest xray as discussed  If symptoms recur, will consider cardiology evaluation   Stress management  Activities to divert attention when possible  Conscious breathing techniques as discussed  Coping mechanisms and strategies vary from person to person so try to utilize strategies that you think may work for you (such as meditation, music, etc  )  Call or return to office if symptoms worsen or if new symptoms develop

## 2022-08-08 NOTE — PROGRESS NOTES
Assessment/Plan:  1  Chest tightness  Chest tightness most likely stress related  EKG done in office today was normal  Will check chest xray as discussed  If symptoms recur, will consider cardiology evaluation   Stress management  Activities to divert attention when possible  Conscious breathing techniques as discussed  Coping mechanisms and strategies vary from person to person so try to utilize strategies that you think may work for you (such as meditation, music, etc  )  Call or return to office if symptoms worsen or if new symptoms develop  - POCT ECG  - XR chest pa & lateral; Future            Subjective:      Patient ID: Perla Connelly is a 39 y o  female who presents for chest tightness    Here for chest tightness  Episode last night  Happened after incident with children  Was yelling at children because they would not clean up after themselves  Chest tightness right side of chest    Similar episode about 2 days prior  No associated symptoms  No sob  No diaphoresis  No nausea  Paternal grandfather- massive MI early 42's  No history of asthma  No history of htn  Feels fine today  Works out regularly  Feels all stress related  The following portions of the patient's history were reviewed and updated as appropriate: allergies, current medications, past family history, past medical history, past social history, past surgical history and problem list     Review of Systems   Constitutional: Negative for fatigue and fever  HENT: Negative for congestion  Respiratory: Positive for chest tightness (resolved currently)  Negative for shortness of breath and wheezing  Cardiovascular: Negative for chest pain, palpitations and leg swelling  Gastrointestinal: Negative for abdominal pain, diarrhea, nausea and vomiting  Musculoskeletal: Negative for arthralgias and myalgias  Skin: Negative for color change and pallor  Allergic/Immunologic: Negative for immunocompromised state  Transition of Care Plan:    RUR: 24    ARSLAN: 8/10? Disposition: Plan for SNF  Referrals were sent to   [address] [place] [address] Joshua E: Pending  -Once we get accepting bed. . will need to get auth from [facility]. Pt goes to HD at [address] [place] [address] M/W/F 6:30 AM chair time. 5:46 PM   Talked to MD and Pt might be stable in next couple of days. He indicated that Pt might need to get IV ABX at HD unit. Pt has significant wound and we want to see if she can go to SNF for wound care and healing. She has not worked with therapy. .    CM talked to [facility] and she was in agreement with sending referral to [address] [place] [address] to see if they can accept for SNF. If accepted it will need auth from [facility] E Talia Navarrete indicated that [address] [place] [address] was supposed to help apply for Medicaid but she never heard back from them. CM sent [employer] and email to have her follow up with Dtr, [facility]. Follow up appointments: PCP: Giorgio Corado:   DME needed: tbd  Transportation at Discharge: BLS transport will need to be arranged. Keys or means to access home:      family  IM Medicare Letter: 1st IM letter was delivered on 8/2/22  2nd IM letter will need to be delivered prior to DC  Is patient a BCPI-A Bundle:   no        If yes, was Bundle Letter given?:    Is patient a  and connected with the [place]?             no   If yes, was [place] transfer form completed and [facility] notified? Caregiver Contact: Granddaughter; Yemi York: [phone]  GD Spouse: Adry Fremont: [phone]  Discharge Caregiver contacted prior to discharge?  yes  Care Conference needed?:     no    Reason for Admission:   Pt was admitted on 8/2/22 d/t very large sacral wound                  RUR Score:           PCP: First and Last name:  Ancelmo Read MD     Name of Practice:   Are you a current patient: Yes/No: yes   Approximate date of last visit: 8/2/22   Can you do a virtual visit with your PCP: yes             Resources/supports as Neurological: Negative for dizziness and headaches  Objective:  poct EKG- NSR, rate 74, no ectopy, no ST elevation  /60   Pulse 101   Temp (!) 97 3 °F (36 3 °C) (Temporal)   Ht 5' 3" (1 6 m)   Wt 72 1 kg (159 lb)   LMP 07/19/2022 (Exact Date)   SpO2 100%   BMI 28 17 kg/m²          Physical Exam  Constitutional:       General: She is not in acute distress  Appearance: She is not ill-appearing  Cardiovascular:      Rate and Rhythm: Normal rate and regular rhythm  Pulmonary:      Effort: Pulmonary effort is normal  No respiratory distress  Breath sounds: Normal breath sounds  No wheezing or rales  Musculoskeletal:      Cervical back: Normal range of motion  Right lower leg: No edema  Left lower leg: No edema  Skin:     General: Skin is warm and dry  Coloration: Skin is not jaundiced or pale  Neurological:      General: No focal deficit present  Mental Status: She is alert and oriented to person, place, and time  Cranial Nerves: No cranial nerve deficit  Sensory: No sensory deficit  Psychiatric:         Mood and Affect: Mood normal          Behavior: Behavior normal          Thought Content:  Thought content normal          Judgment: Judgment normal  identified by patient/family:   Pt has AARP Medicare Complete                Top Challenges facing patient (as identified by patient/family and CM): Finances/Medication cost?     Pt has prescription drug coverage. [facility]: [facility] [address]? Pt has to use WC Matiana Alcaraz to go back and forth to HD center. She is [facility] at [facility] and they EMCOR her to recliner at the center. Anticipating that Pt will need stretcher transport at [place]. Support system or lack thereof? Samuel and her spouse try to care for Pt at home. Living arrangements? Lives with GD and family in two story home with ramp to enter. Self-care/ADLs/Cognition? Pt needs assistance for all ADLs. DME: Rollator, WC, Lift Chair Recliner, EMCOR and Shower Chair. Current Advanced Directive/Advance Care Plan:  DNR      Healthcare Decision Maker:     Primary Decision Maker (Active): [facility] - [phone]    Payor Source Payor: [facility] MEDICARE COMPLETE / Plan: [facility] MEDICARE COMPLETE / Product Type: Managed Care Medicare /                             Plan for utilizing home health:    GD stated that home health was coming two times per week but she can not remember the name of the agency. She will call RN and ask for the name so that we can document it. Care Management Interventions  PCP Verified by CM: Yes  Palliative Care Criteria Met (RRAT>21 & CHF Dx)?: No  Mode of Transport at Discharge:  Other (see comment)  Transition of Care Consult (CM Consult): Discharge Planning, SNF  Partner SNF: Yes  MyChart Signup: No  Discharge Durable Medical Equipment: No  Physical Therapy Consult: No  Occupational Therapy Consult: No  Speech Therapy Consult: No  Support Systems: Child(yasmin), Other Family Member(s)  Confirm Follow Up Transport: Wheelchair [employer] for Transition of Care is Related to the Following Treatment Goals Shazia Yin  [place] [id number] [facility],[address] or [place]  The Patient and/or Patient Representative was Provided with a Choice of Provider and Agrees with the Discharge Plan?: Yes  Name of the Patient Representative Who was Provided with a Choice of Provider and Agrees with the Discharge Plan: Pt  Freedom of Choice List was Provided with Basic Dialogue that Supports the Patient's Individualized Plan of Care/Goals, Treatment Preferences and Shares the Quality Data Associated with the Providers?: Yes  Discharge Location  Patient Expects to be Discharged to[de-identified] [facility]    Mary Kay Fu [facility]  Ext [phone]

## 2022-09-26 ENCOUNTER — OFFICE VISIT (OUTPATIENT)
Dept: OBGYN CLINIC | Facility: CLINIC | Age: 37
End: 2022-09-26
Payer: COMMERCIAL

## 2022-09-26 VITALS
SYSTOLIC BLOOD PRESSURE: 102 MMHG | BODY MASS INDEX: 28.35 KG/M2 | HEIGHT: 63 IN | DIASTOLIC BLOOD PRESSURE: 74 MMHG | WEIGHT: 160 LBS

## 2022-09-26 DIAGNOSIS — Z01.419 ENCNTR FOR GYN EXAM (GENERAL) (ROUTINE) W/O ABN FINDINGS: Primary | ICD-10-CM

## 2022-09-26 PROCEDURE — G0476 HPV COMBO ASSAY CA SCREEN: HCPCS | Performed by: NURSE PRACTITIONER

## 2022-09-26 PROCEDURE — S0612 ANNUAL GYNECOLOGICAL EXAMINA: HCPCS | Performed by: NURSE PRACTITIONER

## 2022-09-26 PROCEDURE — G0145 SCR C/V CYTO,THINLAYER,RESCR: HCPCS | Performed by: NURSE PRACTITIONER

## 2022-09-26 NOTE — PROGRESS NOTES
Assessment/Plan   Diagnoses and all orders for this visit:    Encntr for gyn exam (general) (routine) w/o abn findings  -     Liquid-based pap, screening        Discussion  Reviewed with patient normal exam today  Pap with HPV done today  Reviewed if desires female sterilization call office to schedule surgical consult  Normal breast exam today  Monthly SBEs advised  Vitamin D and [place] Supplements advised  Exercise most days of the week  Follow with PCP for regular check-ups and blood work  RTO 1 year for annual or sooner as needed  Toni Hickey is a 40 y o  female who presents for annual well woman exam    Last exam 2017 Pap Normal HPV negative  Pap guidelines reviewed with patient  Pt would like Pap today  Pt denies any abnormal vaginal discharge, itching, or odor  Pt in a mutually exclusive relationship () with a male partner and denies the need for STD testing today  Menstrual Cycle:  LMP: 2022  Menses monthly, regular  Will get spotting for 4 days then has heavy bleeding for 1-2 days then scant and gone  OB History     G 5 P 4   Contraception: Condoms, NFP, partner planning vasectomy, she has also considered tubal ligation, does not want any contraceptives at this time  Practices monthly SBEs, no breast complaints today  Denies any bowel or bladder issues  Pt follows with PCP for regular check-ups and blood work  Review of Systems   Genitourinary: Negative        The following portions of the patient's history were reviewed and updated as appropriate: allergies, current medications, past family history, past medical history, past social history, past surgical history and problem list     Past Medical History:   Diagnosis Date    Anemia     hx of    Diabetes mellitus ([facility] [address] )     With this pregnancy GDM on insulin    Seasonal allergies     Spontaneous      RESOLVED: [id number]    Threatened      LAST ASSESSED: [id number]    Varicella had as child and received vaccine per pt immune    Visual impairment     eyewear        Past Surgical History:   Procedure Laterality Date     SECTION  2013    LOW TRANSVERSE    KNEE ARTHROSCOPY W/ MENISCAL REPAIR Right 2003    MEDIAL    KNEE CARTILAGE SURGERY Right     2003       Family History   Problem Relation Age of Onset    Diabetes Father     Depression Father     Heart disease Father         CARDIAC DISORDER    Hyperlipidemia Father     Hypertension Father     Thyroid disease Father     Birth defects Daughter         AGENESIS, CORPUS CALLOSUM    Other Daughter         VENTRICULOMEGALY OF BRAIN, CONGENITAL     Arthritis Mother     Asthma Mother    Melva Moraes / [place] Mother     Learning disabilities Sister     Miscarriages / Stillbirths Maternal Aunt     Heart disease Paternal Grandmother     Early death Paternal Grandmother     Heart attack Paternal Grandmother     Hyperlipidemia Paternal Grandmother     Hypertension Paternal Grandmother     Diabetes Paternal Grandfather     Hyperlipidemia Paternal Grandfather     Hypertension Paternal Grandfather     Glycogen storage disease Other         UNSPECIFIEID TYPE       Social History     Socioeconomic History    Marital status: /Civil Union     Spouse name: Not on file    Number of children: Not on file    Years of education: Not on file    Highest education level: Not on file   Occupational History    Not on file   Tobacco Use    Smoking status: Never Smoker    Smokeless tobacco: Never Used   Vaping Use    Vaping Use: Never used   Substance and Sexual Activity    Alcohol use: Yes     Comment: socially    Drug use: No    Sexual activity: Yes     Partners: Male     Birth control/protection: None   Other Topics Concern    Not on file   Social History Narrative    DENIED: HISTORY OF DOMESTIC VIOLENCE     Social Determinants of Health     Financial Resource Strain: Not on file   Food Insecurity: Not on file   Transportation Needs: Not on file   Physical Activity: Not on file   Stress: Not on file   Social Connections: Not on file   Intimate Partner Violence: Not on file   Housing Stability: Not on file       No current outpatient medications on file  Allergies   Allergen Reactions    Kiwi Extract - Food Allergy Rash and Swelling     Mouth irritation  Objective   Vitals:    09/26/22 1036   BP: 102/74   BP Location: Right arm   Patient Position: Sitting   Weight: 72 6 kg (160 lb)   Height: 5' 3" (1 6 m)     Physical Exam  Vitals and nursing note reviewed  Constitutional:       Appearance: She is well-developed  HENT:      Head: Normocephalic  Neck:      Thyroid: No thyromegaly  Trachea: No tracheal deviation  Cardiovascular:      Rate and Rhythm: Normal rate and regular rhythm  Heart sounds: Normal heart sounds  Pulmonary:      Effort: Pulmonary effort is normal       Breath sounds: Normal breath sounds  Chest:   Breasts: Breasts are symmetrical       Right: No inverted nipple, mass, nipple discharge, skin change or tenderness  Left: No inverted nipple, mass, nipple discharge, skin change or tenderness  Abdominal:      General: Bowel sounds are normal  There is no distension  Palpations: Abdomen is soft  There is no mass  Tenderness: There is no abdominal tenderness  There is no guarding or rebound  Genitourinary:     Labia:         Right: No rash, tenderness, lesion or injury  Left: No rash, tenderness, lesion or injury  Vagina: Normal       Cervix: Normal       Uterus: Normal        Adnexa: Right adnexa normal and left adnexa normal         Right: No mass, tenderness or fullness  Left: No mass, tenderness or fullness  Musculoskeletal:         General: Normal range of motion  Cervical back: Normal range of motion and neck supple  Skin:     General: Skin is warm and dry     Neurological:      Mental Status: She is alert and oriented to person, place, and time  Psychiatric:         Behavior: Behavior normal          Thought Content:  Thought content normal          Judgment: Judgment normal

## 2022-09-29 LAB
HPV HR 12 DNA CVX QL NAA+PROBE: NEGATIVE
HPV16 DNA CVX QL NAA+PROBE: NEGATIVE
HPV18 DNA CVX QL NAA+PROBE: NEGATIVE

## 2022-10-10 LAB
LAB AP GYN PRIMARY INTERPRETATION: NORMAL
LAB AP LMP: NORMAL
Lab: NORMAL

## 2022-11-04 ENCOUNTER — RA CDI HCC (OUTPATIENT)
Dept: OTHER | Facility: HOSPITAL | Age: 37
End: 2022-11-04

## 2022-11-04 NOTE — PROGRESS NOTES
[facility]  coding opportunities       Chart reviewed, no opportunity found: CHART REVIEWED, NO OPPORTUNITY FOUND        Patients Insurance        Commercial Insurance: [address] [place] [address]

## 2022-11-07 ENCOUNTER — OFFICE VISIT (OUTPATIENT)
Dept: FAMILY MEDICINE CLINIC | Facility: CLINIC | Age: 37
End: 2022-11-07

## 2022-11-07 VITALS
HEART RATE: 88 BPM | BODY MASS INDEX: 27.82 KG/M2 | RESPIRATION RATE: 16 BRPM | OXYGEN SATURATION: 99 % | DIASTOLIC BLOOD PRESSURE: 64 MMHG | HEIGHT: 63 IN | TEMPERATURE: 97 F | SYSTOLIC BLOOD PRESSURE: 100 MMHG | WEIGHT: 157 LBS

## 2022-11-07 DIAGNOSIS — N94.89 SWELLING OF LABIA: ICD-10-CM

## 2022-11-07 DIAGNOSIS — N64.4 BREAST PAIN, LEFT: Primary | ICD-10-CM

## 2022-11-07 NOTE — PATIENT INSTRUCTIONS
Diagnostic mammogram to be scheduled  Ultrasound left breast to be scheduled  Follow up with gyn regarding labia swelling  Warm compresses  Call or return to office if symptoms worsen or if new symptoms develop

## 2022-11-08 ENCOUNTER — OFFICE VISIT (OUTPATIENT)
Dept: OBGYN CLINIC | Facility: CLINIC | Age: 37
End: 2022-11-08

## 2022-11-08 VITALS
DIASTOLIC BLOOD PRESSURE: 68 MMHG | HEIGHT: 63 IN | BODY MASS INDEX: 27.82 KG/M2 | WEIGHT: 157 LBS | SYSTOLIC BLOOD PRESSURE: 102 MMHG

## 2022-11-08 DIAGNOSIS — N89.8 VAGINAL IRRITATION: ICD-10-CM

## 2022-11-08 DIAGNOSIS — N90.7 LABIAL CYST: Primary | ICD-10-CM

## 2022-11-08 NOTE — PROGRESS NOTES
Assessment/Plan:  Emiliano Tristan is a 40 y o  [id number] with left vulvar cyst who presents for evaluation    1  Labial cyst  US scrotum and groin area      · Left labial cyst around 2 'clock  · No sign of infection  · Potential skene's gland cyst though somewhat lateral for this versus sebaceous gland cyst  · genital culture collected, wet mount with few, scattered yeast  · reviewed warm compresses, tylenol/motrin  · If persistent in 3-4 weeks or newly painful, erythematous, or growing should notify us and pursue imaging  · If changes may warrant antibiotics and/or drainage, however, not currently indicated  · All questions answered to the best of my ability  Subjective:      Patient ID: Emiliano Tristan is a 40 y o  female  HPI  Since  noted small left-sided labial bump  Not painful, but with palpation somewhat tender  Na hx of trauma to the area, pain with sex, discharge, etc   No bleeding, no changes to vulvar care    Recently had GI illness and URI, fever in that setting, but not in last couple of days    The following portions of the patient's history were reviewed and updated as appropriate: She  has a past medical history of Anemia, Diabetes mellitus ([facility] [address] ), Gestational diabetes (2020), Hypothyroidism, Miscarriage (X1), Seasonal allergies, Spontaneous , Threatened , Varicella, and Visual impairment  No current outpatient medications on file prior to visit  No current facility-administered medications on file prior to visit  She is allergic to kiwi extract - food allergy       Review of Systems   Constitutional: Negative for chills and fever  Gastrointestinal: Negative for abdominal pain  Genitourinary: Negative for dyspareunia, pelvic pain and vaginal pain           Objective:  /68 (BP Location: Left arm, Patient Position: Sitting, Cuff Size: Standard)   Ht 5' 3" (1 6 m)   Wt 71 2 kg (157 lb)   LMP 10/14/2022   BMI 27 81 kg/m²    Physical Exam  Constitutional:       Appearance: Normal appearance  HENT:      Head: Normocephalic  Eyes:      Extraocular Movements: Extraocular movements intact  Conjunctiva/sclera: Conjunctivae normal    Pulmonary:      Effort: Pulmonary effort is normal    Abdominal:      General: There is no distension  Palpations: Abdomen is soft  Tenderness: There is no abdominal tenderness  Genitourinary:     Comments: Vulva: 1 5cm soft cystic area at 2 o'clock on labial minora, no overlying skin changes, erythema or discharge, mild ttp  Vagina: normal, no lesions or ttp  Urethra: normal, no lesions, masses or ttp  Bladder: normal, no masses or ttp  Cervix: normal, no lesions, masses or CMT  Uterus: normal-size, normal mobility, good descensus  Adnexa: no masses or ttp    wet mount: neg whiff, neg trich, neg BV, few scattered yeast   Genital culture collected  Musculoskeletal:         General: Normal range of motion  Cervical back: Normal range of motion  Skin:     General: Skin is warm and dry  Neurological:      General: No focal deficit present  Mental Status: She is alert  Psychiatric:         Mood and Affect: Mood normal          Behavior: Behavior normal          Thought Content:  Thought content normal

## 2022-11-10 ENCOUNTER — OFFICE VISIT (OUTPATIENT)
Dept: FAMILY MEDICINE CLINIC | Facility: CLINIC | Age: 37
End: 2022-11-10

## 2022-11-10 VITALS
BODY MASS INDEX: 28.17 KG/M2 | TEMPERATURE: 97 F | WEIGHT: 159 LBS | DIASTOLIC BLOOD PRESSURE: 60 MMHG | HEIGHT: 63 IN | OXYGEN SATURATION: 97 % | RESPIRATION RATE: 16 BRPM | SYSTOLIC BLOOD PRESSURE: 102 MMHG | HEART RATE: 87 BPM

## 2022-11-10 DIAGNOSIS — R05.1 ACUTE COUGH: ICD-10-CM

## 2022-11-10 DIAGNOSIS — J01.00 ACUTE NON-RECURRENT MAXILLARY SINUSITIS: Primary | ICD-10-CM

## 2022-11-10 RX ORDER — FLUTICASONE PROPIONATE 50 MCG
1 SPRAY, SUSPENSION (ML) NASAL DAILY
Qty: 16 G | Refills: 0 | Status: CANCELLED | OUTPATIENT
Start: 2022-11-10

## 2022-11-10 RX ORDER — AMOXICILLIN AND CLAVULANATE POTASSIUM 875; 125 MG/1; MG/1
1 TABLET, FILM COATED ORAL EVERY 12 HOURS SCHEDULED
Qty: 20 TABLET | Refills: 0 | Status: SHIPPED | OUTPATIENT
Start: 2022-11-10 | End: 2022-11-20

## 2022-11-10 NOTE — PROGRESS NOTES
Name: Lexi Dillard      : 1985      MRN: [id number]  Encounter Provider: TONY Cunningham  Encounter Date: 11/10/2022   Encounter department: [address] [place] [address]  Acute non-recurrent maxillary sinusitis  Fluids  Rest  Nasal saline rinses  Symptom management for supportive care such as decongestants, tylenol/motrin as needed for fever or discomfort  Use a cool mist humidifier at bedtime  Flonase as directed  Finish antibiotics as prescribed  Augmentin 875mg twice daily for 10 days  Recommend probiotic while on antibiotic to minimize gastrointestinal side effects  Warm compresses to facilitate nasal drainage  Call or return to office if symptoms worsen or if new symptoms develop  - amoxicillin-clavulanate (Augmentin) 875-125 mg per tablet; Take 1 tablet by mouth every 12 (twelve) hours for 10 days  Dispense: 20 tablet; Refill: 0    2  Acute cough  Secondary to pnd  Symptomatic tx  Subjective      Here for cough, sinus congestion, fevers  Symptoms for 12 days  Did covid test and was negative   Initially fever was up to 101 5 but thermometer at home has been registering "normal" although feels feverish  Has been using mucinex  Review of Systems   Constitutional: Positive for fatigue and fever  HENT: Positive for congestion, postnasal drip, rhinorrhea, sinus pressure and sinus pain  Negative for sore throat  Respiratory: Positive for cough  Negative for shortness of breath  Gastrointestinal: Negative for diarrhea, nausea and vomiting  Musculoskeletal: Positive for myalgias  Skin: Negative for rash  Allergic/Immunologic: Negative for immunocompromised state  Neurological: Negative for dizziness and headaches  Hematological: Negative for adenopathy  No current outpatient medications on file prior to visit         Objective     /60   Pulse 87   Temp (!) 97 °F (36 1 °C) (Temporal)   Resp 16   Ht 5' 3" (1 6 m)   Wt 72 1 kg (159 lb)   LMP 10/14/2022   SpO2 97%   BMI 28 17 kg/m²     Physical Exam  Vitals reviewed  Constitutional:       General: She is not in acute distress  Appearance: Normal appearance  She is not ill-appearing  HENT:      Right Ear: Tympanic membrane and ear canal normal       Left Ear: Tympanic membrane and ear canal normal       Nose: Congestion present  Comments: Turbinates inflamed       Mouth/Throat:      Mouth: Mucous membranes are moist       Pharynx: Oropharynx is clear  No oropharyngeal exudate  Comments: (+) PND  Eyes:      General:         Right eye: No discharge  Left eye: No discharge  Cardiovascular:      Rate and Rhythm: Normal rate and regular rhythm  Pulmonary:      Effort: Pulmonary effort is normal  No respiratory distress  Breath sounds: Normal breath sounds  No wheezing, rhonchi or rales  Musculoskeletal:      Cervical back: Normal range of motion and neck supple  Lymphadenopathy:      Cervical: No cervical adenopathy  Skin:     General: Skin is warm and dry  Coloration: Skin is not jaundiced or pale  Neurological:      General: No focal deficit present  Mental Status: She is alert and oriented to person, place, and time  Cranial Nerves: No cranial nerve deficit  Sensory: No sensory deficit  Psychiatric:         Mood and Affect: Mood normal          Behavior: Behavior normal          Thought Content:  Thought content normal          Judgment: Judgment normal        [facility]

## 2022-11-10 NOTE — PATIENT INSTRUCTIONS
Fluids  Rest  Nasal saline rinses  Symptom management for supportive care such as decongestants, tylenol/motrin as needed for fever or discomfort  Use a cool mist humidifier at bedtime  Flonase as directed  Finish antibiotics as prescribed  Augmentin 875mg twice daily for 10 days  Recommend probiotic while on antibiotic to minimize gastrointestinal side effects  Warm compresses to facilitate nasal drainage  Call or return to office if symptoms worsen or if new symptoms develop

## 2022-11-12 LAB
BACTERIA GENITAL AEROBE CULT: ABNORMAL
BACTERIA GENITAL AEROBE CULT: ABNORMAL

## 2022-12-09 ENCOUNTER — HOSPITAL ENCOUNTER (OUTPATIENT)
Dept: RADIOLOGY | Facility: HOSPITAL | Age: 37
Discharge: HOME | End: 2022-12-09

## 2022-12-09 VITALS — HEIGHT: 63 IN | BODY MASS INDEX: 28.35 KG/M2 | WEIGHT: 160 LBS

## 2022-12-09 DIAGNOSIS — N64.4 BREAST PAIN, LEFT: ICD-10-CM

## 2023-02-09 ENCOUNTER — CONSULT (OUTPATIENT)
Dept: SURGICAL ONCOLOGY | Facility: CLINIC | Age: 38
End: 2023-02-09

## 2023-02-09 VITALS
SYSTOLIC BLOOD PRESSURE: 124 MMHG | OXYGEN SATURATION: 99 % | BODY MASS INDEX: 28.35 KG/M2 | HEIGHT: 63 IN | WEIGHT: 160 LBS | DIASTOLIC BLOOD PRESSURE: 80 MMHG | RESPIRATION RATE: 18 BRPM | TEMPERATURE: 98.3 F | HEART RATE: 120 BPM

## 2023-02-09 DIAGNOSIS — N64.4 BREAST PAIN: Primary | ICD-10-CM

## 2023-02-09 NOTE — PROGRESS NOTES
Surgical Oncology Follow Up       [address] [facility] [address]    Emma Baeza  1985  [id number]  [facility] SURGICAL ONCOLOGY [place]  [place] [address]    Chief Complaint   Patient presents with   • Consult       Assessment/Plan:  1  Breast pain  - EPO, warm compress, supportive bra  - decrease salt/caffeine intake       Discussion/Summary: Patient is a 27-year-old female presenting for a consult for breast pain  She had a bilateral diagnostic mammogram on 12/9/2022 as well as an ultrasound of the left breast which was BI-RADS 2 category 3 density  Ultrasound of the left breast showed several small minimally complicated and simple cysts noted between the 1-2 o'clock position, within the area of pain  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 however she will clarification over complicated versus simple cyst so reassurance that this is something benign  I informed her that the cysts are less than 1 cm so they are too small for aspiration and they are also benign-appearing so there is no need for follow-up surveillance imaging  I informed her that she may try a trial of evening primrose oil for breast pain  She may use warm compress and a supportive bra as well  She may also decrease on caffeine intake which may help with breast pain  I recommended she may have an automated breast ultrasound in the future due to her dense breast tissue  I informed her that she is good for annual breast exams and may begin annual mammography when she turns 40  There were no concerns on her clinical breast exam   I instructed her to call with any questions or concerns in the future  All questions were answered today      History of Present Illness:     Oncology History    No history exists         -Interval History: Patient is a 27-year-old female presenting for a consult for left breast pain and benign breast cyst   She has had no history of issues with her breasts  She has no personal history of breast cancer  She has no family history of breast cancer either  Age of menarche was 15years old  She has had 5 pregnancies and 4 live births  First child was born at the age of 29  She is currently premenopausal   She has never taken hormone replacement therapy  She has not of Ashkenazi Zoroastrian descent but has never had genetic testing  She denies changes on her breast exam     Review of Systems:  Review of Systems   Constitutional: Negative for activity change, appetite change, fatigue and unexpected weight change  HENT: Positive for congestion and postnasal drip  Respiratory: Negative for cough and shortness of breath  Cardiovascular: Negative for chest pain  Gastrointestinal: Negative for abdominal pain, diarrhea, nausea and vomiting  Endocrine: Negative for heat intolerance  Musculoskeletal: Negative for arthralgias, back pain and myalgias  Skin: Negative for rash  Allergic/Immunologic: Positive for environmental allergies  Neurological: Negative for weakness and headaches  Hematological: Negative for adenopathy         Patient Active Problem List   Diagnosis   • Allergic rhinitis, seasonal   • Family history of congenital malformation   • Vaginal birth after  delivery   • Breast pain     Past Medical History:   Diagnosis Date   • Anemia     hx of   • Diabetes mellitus ([address] )     With this pregnancy GDM on insulin   • Gestational diabetes 2020    Resolved with delivery   • Hypothyroidism     Subclinical   • Miscarriage X1   • Seasonal allergies    • Spontaneous      RESOLVED: [id number]   • Threatened      LAST ASSESSED: [id number]   • Varicella     had as child and received vaccine per pt immune   • Visual impairment     eyewear      Past Surgical History:   Procedure Laterality Date   •  SECTION  2013    LOW TRANSVERSE   • KNEE ARTHROSCOPY W/ MENISCAL REPAIR Right 2003    MEDIAL   • KNEE CARTILAGE SURGERY Right          Family History   Problem Relation Age of Onset   • Diabetes Father    • Depression Father    • Heart disease Father         CARDIAC DISORDER   • Hyperlipidemia Father    • Hypertension Father    • Thyroid disease Father    • Birth defects Daughter         AGENESIS, CORPUS CALLOSUM   • Other Daughter         VENTRICULOMEGALY OF BRAIN, CONGENITAL    • Arthritis Mother    • Asthma Mother    • Miscarriages / [place] Mother    • Learning disabilities Sister    • Miscarriages / Stillbirths Maternal Aunt    • Heart attack Maternal Grandfather    • Heart disease Maternal Grandfather          in mid 45s from MI   • Heart disease Paternal Grandmother    • Early death Paternal Grandmother    • Heart attack Paternal Grandmother    • Hyperlipidemia Paternal Grandmother    • Hypertension Paternal Grandmother    • Diabetes Paternal Grandfather    • Hyperlipidemia Paternal Grandfather    • Hypertension Paternal Grandfather    • Glycogen storage disease Other         UNSPECIFIEID TYPE     Social History     Socioeconomic History   • Marital status: /Civil Union     Spouse name: Not on file   • Number of children: Not on file   • Years of education: Not on file   • Highest education level: Not on file   Occupational History   • Not on file   Tobacco Use   • Smoking status: Never   • Smokeless tobacco: Never   Vaping Use   • Vaping Use: Never used   Substance and Sexual Activity   • Alcohol use: Yes     Comment: socially   • Drug use: No   • Sexual activity: Yes     Partners: Male     Birth control/protection: Coitus interruptus   Other Topics Concern   • Not on file   Social History Narrative    DENIED: HISTORY OF DOMESTIC VIOLENCE     Social Determinants of Health     Financial Resource Strain: Not on file   Food Insecurity: Not on file   Transportation Needs: Not on file Physical Activity: Not on file   Stress: Not on file   Social Connections: Not on file   Intimate Partner Violence: Not on file   Housing Stability: Not on file     No current outpatient medications on file  Allergies   Allergen Reactions   • Kiwi Extract - Food Allergy Rash and Swelling     Mouth irritation  Vitals:    02/09/23 1249   BP: 124/80   Pulse: (!) 120   Resp: 18   Temp: 98 3 °F (36 8 °C)   SpO2: 99%       Physical Exam  Constitutional:       General: She is not in acute distress  Appearance: Normal appearance  Cardiovascular:      Rate and Rhythm: Normal rate and regular rhythm  Pulses: Normal pulses  Heart sounds: Normal heart sounds  Pulmonary:      Effort: Pulmonary effort is normal       Breath sounds: Normal breath sounds  Chest:      Chest wall: No mass  Breasts:     Right: No swelling, bleeding, inverted nipple, mass, nipple discharge, skin change or tenderness  Left: No swelling, bleeding, inverted nipple, mass, nipple discharge, skin change or tenderness  Comments: No masses, nodularity, skin changes, nipple changes or discharge, or adenopathy appreciated on physical exam      Abdominal:      General: Abdomen is flat  Palpations: Abdomen is soft  Lymphadenopathy:      Upper Body:      Right upper body: No supraclavicular, axillary or pectoral adenopathy  Left upper body: No supraclavicular, axillary or pectoral adenopathy  Skin:     General: Skin is warm  Neurological:      General: No focal deficit present  Mental Status: She is alert and oriented to person, place, and time  Psychiatric:         Mood and Affect: Mood normal          Behavior: Behavior normal            Results:    Imaging  No results found  I reviewed the above imaging data  Advance Care Planning/Advance Directives:  Discussed disease status, cancer treatment plans and/or cancer treatment goals with the patient

## 2023-05-30 ENCOUNTER — TELEPHONE (OUTPATIENT)
Dept: FAMILY MEDICINE CLINIC | Facility: CLINIC | Age: 38
End: 2023-05-30

## 2023-05-30 DIAGNOSIS — Z13.220 SCREENING FOR LIPID DISORDERS: ICD-10-CM

## 2023-05-30 DIAGNOSIS — R79.89 ELEVATED TSH: Primary | ICD-10-CM

## 2023-05-30 DIAGNOSIS — Z13.29 SCREENING FOR THYROID DISORDER: ICD-10-CM

## 2023-05-30 DIAGNOSIS — Z00.00 ANNUAL PHYSICAL EXAM: ICD-10-CM

## 2023-05-30 DIAGNOSIS — E78.00 ELEVATED LDL CHOLESTEROL LEVEL: ICD-10-CM

## 2023-05-30 DIAGNOSIS — Z13.1 SCREENING FOR DIABETES MELLITUS (DM): ICD-10-CM

## 2023-05-30 NOTE — TELEPHONE ENCOUNTER
----- Message from [address] sent at 5/30/2023  6:58 AM EDT -----  Regarding: labs  Pt scheduled self for annual physical on 6/2  Lab orders just placed  Please call pt to advise  If she is not able to have labs done (and resulted by 6/2), appt will need to be re-scheduled  LM for pt to have labs done prior to appt

## 2023-05-31 ENCOUNTER — APPOINTMENT (OUTPATIENT)
Dept: LAB | Facility: HOSPITAL | Age: 38
End: 2023-05-31
Payer: COMMERCIAL

## 2023-05-31 DIAGNOSIS — Z00.00 ROUTINE GENERAL MEDICAL EXAMINATION AT A HEALTH CARE FACILITY: Primary | ICD-10-CM

## 2023-05-31 LAB
ALBUMIN SERPL BCP-MCNC: 4.8 G/DL (ref 3.5–5)
ALP SERPL-CCNC: 55 U/L (ref 34–104)
ALT SERPL W P-5'-P-CCNC: 10 U/L (ref 7–52)
ANION GAP SERPL CALCULATED.3IONS-SCNC: 6 MMOL/L (ref 4–13)
AST SERPL W P-5'-P-CCNC: 18 U/L (ref 13–39)
BASOPHILS # BLD AUTO: 0.03 THOUSANDS/ÂΜL (ref 0–0.1)
BASOPHILS NFR BLD AUTO: 1 % (ref 0–1)
BILIRUB SERPL-MCNC: 1.79 MG/DL (ref 0.2–1)
BUN SERPL-MCNC: 11 MG/DL (ref 5–25)
CALCIUM SERPL-MCNC: 10 MG/DL (ref 8.4–10.2)
CHLORIDE SERPL-SCNC: 105 MMOL/L (ref 96–108)
CHOLEST SERPL-MCNC: 178 MG/DL
CO2 SERPL-SCNC: 28 MMOL/L (ref 21–32)
CREAT SERPL-MCNC: 0.84 MG/DL (ref 0.6–1.3)
EOSINOPHIL # BLD AUTO: 0.03 THOUSAND/ÂΜL (ref 0–0.61)
EOSINOPHIL NFR BLD AUTO: 1 % (ref 0–6)
ERYTHROCYTE [DISTWIDTH] IN BLOOD BY AUTOMATED COUNT: 12.7 % (ref 11.6–15.1)
EST. AVERAGE GLUCOSE BLD GHB EST-MCNC: 97 MG/DL
GFR SERPL CREATININE-BSD FRML MDRD: 89 ML/MIN/1.73SQ M
GLUCOSE P FAST SERPL-MCNC: 84 MG/DL (ref 65–99)
HBA1C MFR BLD: 5 %
HCT VFR BLD AUTO: 39.8 % (ref 34.8–46.1)
HDLC SERPL-MCNC: 44 MG/DL
HGB BLD-MCNC: 13.7 G/DL (ref 11.5–15.4)
IMM GRANULOCYTES # BLD AUTO: 0.02 THOUSAND/UL (ref 0–0.2)
IMM GRANULOCYTES NFR BLD AUTO: 0 % (ref 0–2)
LDLC SERPL CALC-MCNC: 122 MG/DL (ref 0–100)
LYMPHOCYTES # BLD AUTO: 1.32 THOUSANDS/ÂΜL (ref 0.6–4.47)
LYMPHOCYTES NFR BLD AUTO: 26 % (ref 14–44)
MCH RBC QN AUTO: 31.2 PG (ref 26.8–34.3)
MCHC RBC AUTO-ENTMCNC: 34.4 G/DL (ref 31.4–37.4)
MCV RBC AUTO: 91 FL (ref 82–98)
MONOCYTES # BLD AUTO: 0.38 THOUSAND/ÂΜL (ref 0.17–1.22)
MONOCYTES NFR BLD AUTO: 7 % (ref 4–12)
NEUTROPHILS # BLD AUTO: 3.4 THOUSANDS/ÂΜL (ref 1.85–7.62)
NEUTS SEG NFR BLD AUTO: 65 % (ref 43–75)
NONHDLC SERPL-MCNC: 134 MG/DL
NRBC BLD AUTO-RTO: 0 /100 WBCS
PLATELET # BLD AUTO: 219 THOUSANDS/UL (ref 149–390)
PMV BLD AUTO: 9 FL (ref 8.9–12.7)
POTASSIUM SERPL-SCNC: 4.1 MMOL/L (ref 3.5–5.3)
PROT SERPL-MCNC: 7.4 G/DL (ref 6.4–8.4)
RBC # BLD AUTO: 4.39 MILLION/UL (ref 3.81–5.12)
SODIUM SERPL-SCNC: 139 MMOL/L (ref 135–147)
TRIGL SERPL-MCNC: 62 MG/DL
TSH SERPL DL<=0.05 MIU/L-ACNC: 2.61 UIU/ML (ref 0.45–4.5)
WBC # BLD AUTO: 5.18 THOUSAND/UL (ref 4.31–10.16)

## 2023-05-31 PROCEDURE — 85025 COMPLETE CBC W/AUTO DIFF WBC: CPT

## 2023-05-31 PROCEDURE — 84443 ASSAY THYROID STIM HORMONE: CPT

## 2023-05-31 PROCEDURE — 80053 COMPREHEN METABOLIC PANEL: CPT

## 2023-05-31 PROCEDURE — 36415 COLL VENOUS BLD VENIPUNCTURE: CPT

## 2023-05-31 PROCEDURE — 80061 LIPID PANEL: CPT

## 2023-05-31 PROCEDURE — 83036 HEMOGLOBIN GLYCOSYLATED A1C: CPT

## 2023-06-02 ENCOUNTER — OFFICE VISIT (OUTPATIENT)
Dept: FAMILY MEDICINE CLINIC | Facility: CLINIC | Age: 38
End: 2023-06-02

## 2023-06-02 VITALS
WEIGHT: 158 LBS | BODY MASS INDEX: 29.08 KG/M2 | HEIGHT: 62 IN | TEMPERATURE: 97.4 F | RESPIRATION RATE: 16 BRPM | HEART RATE: 71 BPM | DIASTOLIC BLOOD PRESSURE: 74 MMHG | SYSTOLIC BLOOD PRESSURE: 108 MMHG | OXYGEN SATURATION: 99 %

## 2023-06-02 DIAGNOSIS — R17 ELEVATED BILIRUBIN: ICD-10-CM

## 2023-06-02 DIAGNOSIS — Z00.00 ANNUAL PHYSICAL EXAM: Primary | ICD-10-CM

## 2023-06-02 NOTE — PATIENT INSTRUCTIONS
Heart healthy, carbohydrate controlled diet- limit red meat, limit saturated fat, moderate salt intake, limit junk food, etc    Regular exercise  Stress management  Routine labwork and screenings as ordered  Abdominal ultrasound to be scheduled

## 2023-06-02 NOTE — PROGRESS NOTES
FAMILY PRACTICE HEALTH MAINTENANCE OFFICE VISIT  [facility]    NAME: Cheli Fiore  AGE: 40 y o  SEX: female  : 1985     DATE: 2023    Assessment and Plan   1  Annual physical exam  Heart healthy, carbohydrate controlled diet- limit red meat, limit saturated fat, moderate salt intake, limit junk food, etc    Regular exercise  Stress management  Routine labwork and screenings as ordered  2  Elevated bilirubin  - US abdomen complete; Future      · Patient Counseling:   · Nutrition: Stressed importance of a well balanced diet, moderation of sodium/saturated fat, caloric balance and sufficient intake of fiber  · Exercise: Stressed the importance of regular exercise with a goal of 150 minutes per week  · Dental Health: Discussed daily flossing and brushing and regular dental visits   · Alcohol Use:  Recommended moderation of alcohol intake  · Injury Prevention: Discussed Safety Belts, Safety Helmets, and Smoke Detectors    · Immunizations reviewed: Risks and Benefits discussed  · Discussed benefits of:  Cervical Cancer screening and Screening labs  • BMI Counseling: Body mass index is 28 9 kg/m²  Discussed with patient's BMI with her  The BMI is above normal  Nutrition recommendations include reducing portion sizes and decreasing overall calorie intake  Exercise recommendations include exercising 3-5 times per week  Chief Complaint     Chief Complaint   Patient presents with   • Physical Exam       History of Present Illness     Here for annual exam and lab review  Feels well  No recent illness  Upon review of labs, pt stated that she believes that bilirubin is chronically miildly elevated  Does believe she has ever had ultrasound   No abd symptoms- denies pain, n/v/d, fever, etc            Well Adult Physical   Patient here for a comprehensive physical exam       Diet and Physical Activity  Diet: well balanced diet  Exercise: daily Depression Screen  PHQ-2/9 Depression Screening    Little interest or pleasure in doing things: 0 - not at all  Feeling down, depressed, or hopeless: 0 - not at all  PHQ-2 Score: 0  PHQ-2 Interpretation: Negative depression screen     Depression Screening Follow-up Plan: Patient's depression screening was negative with a PHQ-2 score of 0    Clinically patient does not have depression  No treatment is required  General Health  Hearing: Normal:  bilateral  Vision: goes for regular eye exams, most recent eye exam <1 year and wears contacts  Dental: regular dental visits    Reproductive Health  Follows with gynecologist      The following portions of the patient's history were reviewed and updated as appropriate: allergies, current medications, past family history, past medical history, past social history, past surgical history and problem list     Review of Systems     Review of Systems   Constitutional: Negative for chills, diaphoresis, fatigue and fever  HENT: Negative for congestion, ear pain, sinus pressure, sinus pain and sore throat  Eyes: Negative for visual disturbance  Wears contacts   Respiratory: Negative for cough, chest tightness, shortness of breath and wheezing  Cardiovascular: Negative for chest pain, palpitations and leg swelling  Gastrointestinal: Negative for abdominal distention, abdominal pain, diarrhea and nausea  Endocrine: Negative for polydipsia  Genitourinary: Negative for dysuria, frequency and urgency  Musculoskeletal: Negative for arthralgias, back pain and myalgias  Skin: Negative for rash and wound  Allergic/Immunologic: Negative for immunocompromised state  Neurological: Negative for dizziness, weakness and headaches  Hematological: Negative for adenopathy  Psychiatric/Behavioral: Negative for dysphoric mood  The patient is not nervous/anxious  All other systems reviewed and are negative        Past Medical History     Past Medical History: Diagnosis Date   • Anemia     hx of   • Diabetes mellitus ([address] )     With this pregnancy GDM on insulin   • Gestational diabetes 2020    Resolved with delivery   • Hypothyroidism     Subclinical   • Miscarriage X1   • Seasonal allergies    • Spontaneous      RESOLVED: [id number]   • Threatened      LAST ASSESSED: [id number]   • Varicella     had as child and received vaccine per pt immune   • Visual impairment     eyewear        Past Surgical History     Past Surgical History:   Procedure Laterality Date   •  SECTION  2013    LOW TRANSVERSE   • KNEE ARTHROSCOPY W/ MENISCAL REPAIR Right     MEDIAL   • KNEE CARTILAGE SURGERY Right     2003       Social History     Social History     Socioeconomic History   • Marital status: /Civil Union     Spouse name: None   • Number of children: None   • Years of education: None   • Highest education level: None   Occupational History   • None   Tobacco Use   • Smoking status: Never   • Smokeless tobacco: Never   Vaping Use   • Vaping Use: Never used   Substance and Sexual Activity   • Alcohol use: Yes     Comment: socially   • Drug use: No   • Sexual activity: Yes     Partners: Male     Birth control/protection: Coitus interruptus   Other Topics Concern   • None   Social History Narrative    DENIED: HISTORY OF DOMESTIC VIOLENCE     Social Determinants of Health     Financial Resource Strain: Not on file   Food Insecurity: Not on file   Transportation Needs: Not on file   Physical Activity: Not on file   Stress: Not on file   Social Connections: Not on file   Intimate Partner Violence: Not on file   Housing Stability: Not on file       Family History     Family History   Problem Relation Age of Onset   • Diabetes Father    • Depression Father    • Heart disease Father         CARDIAC DISORDER   • Hyperlipidemia Father    • Hypertension Father    • Thyroid disease Father    • Birth defects Daughter         AGENESIS, CORPUS CALLOSUM   • Other Daughter         VENTRICULOMEGALY OF BRAIN, CONGENITAL    • Arthritis Mother    • Asthma Mother    • Miscarriages / [place] Mother    • Learning disabilities Sister    • Miscarriages / Stillbirths Maternal Aunt    • Heart attack Maternal Grandfather    • Heart disease Maternal Grandfather          in mid 45s from MI   • Heart disease Paternal Grandmother    • Early death Paternal Grandmother    • Heart attack Paternal Grandmother    • Hyperlipidemia Paternal Grandmother    • Hypertension Paternal Grandmother    • Diabetes Paternal Grandfather    • Hyperlipidemia Paternal Grandfather    • Hypertension Paternal Grandfather    • Glycogen storage disease Other         UNSPECIFIEID TYPE       Current Medications     No current outpatient medications on file  Allergies     Allergies   Allergen Reactions   • Kiwi Extract - Food Allergy Rash and Swelling     Mouth irritation  Objective     Recent Results (from the past 672 hour(s))   Hemoglobin A1C    Collection Time: 23  9:20 AM   Result Value Ref Range    Hemoglobin A1C 5 0 Normal 3 8-5 6%; PreDiabetic 5 7-6 4%;  Diabetic >=6 5%; Glycemic control for adults with diabetes <7 0% %    EAG 97 mg/dl   Comprehensive metabolic panel    Collection Time: 23  9:20 AM   Result Value Ref Range    Sodium 139 135 - 147 mmol/L    Potassium 4 1 3 5 - 5 3 mmol/L    Chloride 105 96 - 108 mmol/L    CO2 28 21 - 32 mmol/L    ANION GAP 6 4 - 13 mmol/L    BUN 11 5 - 25 mg/dL    Creatinine 0 84 0 60 - 1 30 mg/dL    Glucose, Fasting 84 65 - 99 mg/dL    Calcium 10 0 8 4 - 10 2 mg/dL    AST 18 13 - 39 U/L    ALT 10 7 - 52 U/L    Alkaline Phosphatase 55 34 - 104 U/L    Total Protein 7 4 6 4 - 8 4 g/dL    Albumin 4 8 3 5 - 5 0 g/dL    Total Bilirubin 1 79 (H) 0 20 - 1 00 mg/dL    eGFR 89 ml/min/1 73sq m   TSH, 3rd generation    Collection Time: 23  9:20 AM   Result Value Ref Range    TSH 3RD GENERATON 2 610 0 450 - 4 500 uIU/mL   Lipid panel    Collection Time: "05/31/23  9:20 AM   Result Value Ref Range    Cholesterol 178 See Comment mg/dL    Triglycerides 62 See Comment mg/dL    HDL, Direct 44 (L) >=50 mg/dL    LDL Calculated 122 (H) 0 - 100 mg/dL    Non-HDL-Chol (CHOL-HDL) 134 mg/dl   CBC and differential    Collection Time: 05/31/23  9:20 AM   Result Value Ref Range    WBC 5 18 4 31 - 10 16 Thousand/uL    RBC 4 39 3 81 - 5 12 Million/uL    Hemoglobin 13 7 11 5 - 15 4 g/dL    Hematocrit 39 8 34 8 - 46 1 %    MCV 91 82 - 98 fL    MCH 31 2 26 8 - 34 3 pg    MCHC 34 4 31 4 - 37 4 g/dL    RDW 12 7 11 6 - 15 1 %    MPV 9 0 8 9 - 12 7 fL    Platelets 041 119 - 189 Thousands/uL    nRBC 0 /100 WBCs    Neutrophils Relative 65 43 - 75 %    Immat GRANS % 0 0 - 2 %    Lymphocytes Relative 26 14 - 44 %    Monocytes Relative 7 4 - 12 %    Eosinophils Relative 1 0 - 6 %    Basophils Relative 1 0 - 1 %    Neutrophils Absolute 3 40 1 85 - 7 62 Thousands/µL    Immature Grans Absolute 0 02 0 00 - 0 20 Thousand/uL    Lymphocytes Absolute 1 32 0 60 - 4 47 Thousands/µL    Monocytes Absolute 0 38 0 17 - 1 22 Thousand/µL    Eosinophils Absolute 0 03 0 00 - 0 61 Thousand/µL    Basophils Absolute 0 03 0 00 - 0 10 Thousands/µL   Reviewed lab/diagnostic results with pt including both normal and abnormal findings  In depth counseling and instructions given  All questions answered during visit  Labs 11/2021 bilirubin 1 4    /74   Pulse 71   Temp (!) 97 4 °F (36 3 °C) (Temporal)   Resp 16   Ht 5' 2\" (1 575 m)   Wt 71 7 kg (158 lb)   SpO2 99%   BMI 28 90 kg/m²      Physical Exam  Vitals reviewed  Constitutional:       General: She is not in acute distress  Appearance: Normal appearance  She is well-developed  She is not ill-appearing  HENT:      Head: Normocephalic and atraumatic        Right Ear: Tympanic membrane and ear canal normal       Left Ear: Tympanic membrane and ear canal normal       Nose: Nose normal       Mouth/Throat:      Mouth: Mucous membranes are moist       " Pharynx: Oropharynx is clear  Eyes:      Extraocular Movements: Extraocular movements intact  Pupils: Pupils are equal, round, and reactive to light  Neck:      Thyroid: No thyromegaly  Vascular: No carotid bruit  Cardiovascular:      Rate and Rhythm: Normal rate and regular rhythm  Heart sounds: Normal heart sounds  No murmur heard  Pulmonary:      Effort: Pulmonary effort is normal  No respiratory distress  Breath sounds: Normal breath sounds  No wheezing or rales  Abdominal:      General: Bowel sounds are normal  There is no distension  Palpations: Abdomen is soft  Tenderness: There is no abdominal tenderness  Musculoskeletal:         General: Normal range of motion  Cervical back: Normal range of motion and neck supple  Right lower leg: No edema  Left lower leg: No edema  Lymphadenopathy:      Cervical: No cervical adenopathy  Skin:     General: Skin is warm and dry  Coloration: Skin is not jaundiced or pale  Neurological:      General: No focal deficit present  Mental Status: She is alert and oriented to person, place, and time  Cranial Nerves: No cranial nerve deficit  Sensory: No sensory deficit  Psychiatric:         Mood and Affect: Mood normal          Behavior: Behavior normal          Thought Content:  Thought content normal          Judgment: Judgment normal            Vision Screening    Right eye Left eye Both eyes   Without correction      With correction 20/13 20/13 20/13   Comments: [address] [place] [address]

## 2023-07-25 ENCOUNTER — OFFICE VISIT (OUTPATIENT)
Dept: FAMILY MEDICINE CLINIC | Facility: CLINIC | Age: 38
End: 2023-07-25
Payer: COMMERCIAL

## 2023-07-25 VITALS
BODY MASS INDEX: 29.44 KG/M2 | HEART RATE: 70 BPM | RESPIRATION RATE: 16 BRPM | OXYGEN SATURATION: 99 % | DIASTOLIC BLOOD PRESSURE: 68 MMHG | TEMPERATURE: 97.1 F | HEIGHT: 62 IN | WEIGHT: 160 LBS | SYSTOLIC BLOOD PRESSURE: 112 MMHG

## 2023-07-25 DIAGNOSIS — L23.7 POISON IVY DERMATITIS: Primary | ICD-10-CM

## 2023-07-25 PROCEDURE — 99213 OFFICE O/P EST LOW 20 MIN: CPT | Performed by: NURSE PRACTITIONER

## 2023-07-25 RX ORDER — PREDNISONE 20 MG/1
20 TABLET ORAL 2 TIMES DAILY WITH MEALS
Qty: 14 TABLET | Refills: 0 | Status: SHIPPED | OUTPATIENT
Start: 2023-07-25 | End: 2023-08-01

## 2023-07-25 NOTE — PROGRESS NOTES
Name: Sage Valdez      : 1985      MRN: [id number]  Encounter Provider: TONY Bustillo  Encounter Date: 2023   Encounter department: [address] [place] [address]   1. Poison ivy dermatitis  Prednisone 20mg twice daily with food for one week  Topical hydrocortisone 2.5% as needed. Call or return to office if symptoms worsen or if new symptoms develop. - predniSONE 20 mg tablet; Take 1 tablet (20 mg total) by mouth 2 (two) times a day with meals for 7 days  Dispense: 14 tablet; Refill: 0  - hydrocortisone 2.5 % cream; Apply topically 2 (two) times a day as needed for rash  Dispense: 30 g; Refill: 1         Subjective      Here for probable poison ivy  Was pulling weeds last weekend and thinks was exposed to poison ivy then. Initially on right arm and now spreading on both arms and starting to weep  Very itchy  Tried calamine and topical hydrocortisone. Last night took benadryl  No chest tightness or sob. Review of Systems   Respiratory: Negative for chest tightness and wheezing. Skin: Positive for rash. No current outpatient medications on file prior to visit. Objective     /68   Pulse 70   Temp (!) 97.1 °F (36.2 °C) (Temporal)   Resp 16   Ht 5' 2" (1.575 m)   Wt 72.6 kg (160 lb)   SpO2 99%   BMI 29.26 kg/m²     Physical Exam  Cardiovascular:      Rate and Rhythm: Normal rate. Pulmonary:      Effort: Pulmonary effort is normal.   Skin:     Comments: Scattered raised lesions, some fluid filled, some linear. Scant yellow exudate from some. Lesions to B/L upper ext   Neurological:      Mental Status: She is oriented to person, place, and time.    Psychiatric:         Mood and Affect: Mood normal.       TONY Bustillo

## 2023-07-25 NOTE — PATIENT INSTRUCTIONS
Prednisone 20mg twice daily with food for one week  Topical hydrocortisone 2.5% as needed. Call or return to office if symptoms worsen or if new symptoms develop.

## 2023-08-07 ENCOUNTER — OFFICE VISIT (OUTPATIENT)
Dept: FAMILY MEDICINE CLINIC | Facility: CLINIC | Age: 38
End: 2023-08-07
Payer: COMMERCIAL

## 2023-08-07 VITALS
HEIGHT: 62 IN | BODY MASS INDEX: 29.08 KG/M2 | DIASTOLIC BLOOD PRESSURE: 78 MMHG | RESPIRATION RATE: 12 BRPM | WEIGHT: 158 LBS | HEART RATE: 102 BPM | TEMPERATURE: 97.3 F | OXYGEN SATURATION: 98 % | SYSTOLIC BLOOD PRESSURE: 100 MMHG

## 2023-08-07 DIAGNOSIS — L23.7 POISON IVY DERMATITIS: Primary | ICD-10-CM

## 2023-08-07 PROCEDURE — 99213 OFFICE O/P EST LOW 20 MIN: CPT | Performed by: NURSE PRACTITIONER

## 2023-08-07 PROCEDURE — 96372 THER/PROPH/DIAG INJ SC/IM: CPT | Performed by: NURSE PRACTITIONER

## 2023-08-07 RX ORDER — PREDNISONE 10 MG/1
TABLET ORAL
Qty: 63 TABLET | Refills: 0 | Status: SHIPPED | OUTPATIENT
Start: 2023-08-07

## 2023-08-07 RX ORDER — METHYLPREDNISOLONE ACETATE 40 MG/ML
40 INJECTION, SUSPENSION INTRA-ARTICULAR; INTRALESIONAL; INTRAMUSCULAR; SOFT TISSUE ONCE
Status: COMPLETED | OUTPATIENT
Start: 2023-08-07 | End: 2023-08-07

## 2023-08-07 RX ADMIN — METHYLPREDNISOLONE ACETATE 40 MG: 40 INJECTION, SUSPENSION INTRA-ARTICULAR; INTRALESIONAL; INTRAMUSCULAR; SOFT TISSUE at 10:51

## 2023-08-07 NOTE — PROGRESS NOTES
Name: Meaghan Tim      : 1985      MRN: [id number]  Encounter Provider: TONY Swenson  Encounter Date: 2023   Encounter department: [address] [place] [address]   1. Poison ivy dermatitis  You were given Depomedrol 40mg via injection in the office today  Start Prednisone 10mg  taper as prescribed:  6 tablets daily for 3 days  then   5 tablets daily for 3 days then   4 tablets daily for 3 days then   3 tablets daily for 3 days then   2 tablets daily for 3 days then   1 tablet daily for 3 days. Take Prednisone with food. Call or return to office if symptoms worsen or if new symptoms develop. - methylPREDNISolone acetate (DEPO-MEDROL) injection 40 mg  - predniSONE 10 mg tablet; 6 tab qd x 3 then 5 tab qd x 3 then 4 tab qd x 3 then 3 tab x3 then 2 tab qd x 3 then 1 tab qd x 3  Dispense: 63 tablet; Refill: 0    Patient was counseled regarding instructions for management which included: impression/diagnosis, risk/benefits of treatment options, importance of compliance with treatment, risk factor reduction, and prognosis. I have reviewed the instructions with the patient answering all questions and patient verbalized understanding. Subjective      Here for poison ivy  Seen in office on  and treated for same with short course of prednisone (20mg x 5 days. )  Did not resolve. Actually spreading. Seemed to be getting better and now with "rebound rash". Now with areas both arms, breasts, abdomen. No sob, no chest tightness, no wheeze. Review of Systems   Constitutional: Negative for fever. Respiratory: Negative for chest tightness, shortness of breath and wheezing. Skin: Positive for rash.        Current Outpatient Medications on File Prior to Visit   Medication Sig   • hydrocortisone 2.5 % cream Apply topically 2 (two) times a day as needed for rash       Objective     /78 (BP Location: Left arm, Patient Position: Sitting, Cuff Size: Large)   Pulse 102   Temp (!) 97.3 °F (36.3 °C) (Temporal)   Resp 12   Ht 5' 2" (1.575 m)   Wt 71.7 kg (158 lb)   LMP 07/31/2023 (Approximate)   SpO2 98%   BMI 28.90 kg/m²     Physical Exam  Vitals reviewed. Constitutional:       General: She is not in acute distress. Cardiovascular:      Rate and Rhythm: Normal rate. Pulmonary:      Effort: Pulmonary effort is normal.   Skin:     General: Skin is warm and dry. Comments: Scattered erythematous lesions to bilateral upper ext, right breast, right abdomen, lower abdomen  Some linear. Some dry and others moist.      Neurological:      Mental Status: She is alert and oriented to person, place, and time.    Psychiatric:         Mood and Affect: Mood normal.         Behavior: Behavior normal.       Nayla Gonzalez

## 2023-08-07 NOTE — PATIENT INSTRUCTIONS
You were given Depomedrol 40mg via injection in the office today  Start Prednisone 10mg  taper as prescribed:  6 tablets daily for 3 days  then   5 tablets daily for 3 days then   4 tablets daily for 3 days then   3 tablets daily for 3 days then   2 tablets daily for 3 days then   1 tablet daily for 3 days. Take Prednisone with food. Call or return to office if symptoms worsen or if new symptoms develop.

## 2023-09-13 ENCOUNTER — HOSPITAL ENCOUNTER (OUTPATIENT)
Dept: RADIOLOGY | Facility: HOSPITAL | Age: 38
Discharge: HOME | End: 2023-09-13
Payer: COMMERCIAL

## 2023-09-13 DIAGNOSIS — R17 ELEVATED BILIRUBIN: ICD-10-CM

## 2023-09-13 PROCEDURE — 76700 US EXAM ABDOM COMPLETE: CPT

## 2023-12-18 ENCOUNTER — OFFICE VISIT (OUTPATIENT)
Dept: FAMILY MEDICINE CLINIC | Facility: CLINIC | Age: 38
End: 2023-12-18
Payer: COMMERCIAL

## 2023-12-18 VITALS
HEIGHT: 62 IN | RESPIRATION RATE: 16 BRPM | WEIGHT: 167 LBS | DIASTOLIC BLOOD PRESSURE: 70 MMHG | HEART RATE: 94 BPM | SYSTOLIC BLOOD PRESSURE: 114 MMHG | TEMPERATURE: 97 F | BODY MASS INDEX: 30.73 KG/M2 | OXYGEN SATURATION: 98 %

## 2023-12-18 DIAGNOSIS — J01.00 ACUTE NON-RECURRENT MAXILLARY SINUSITIS: Primary | ICD-10-CM

## 2023-12-18 PROCEDURE — 99214 OFFICE O/P EST MOD 30 MIN: CPT | Performed by: NURSE PRACTITIONER

## 2023-12-18 RX ORDER — FLUTICASONE PROPIONATE 50 MCG
1 SPRAY, SUSPENSION (ML) NASAL DAILY
Qty: 16 G | Refills: 0 | Status: CANCELLED | OUTPATIENT
Start: 2023-12-18

## 2023-12-18 RX ORDER — AMOXICILLIN AND CLAVULANATE POTASSIUM 875; 125 MG/1; MG/1
1 TABLET, FILM COATED ORAL EVERY 12 HOURS SCHEDULED
Qty: 20 TABLET | Refills: 0 | Status: SHIPPED | OUTPATIENT
Start: 2023-12-18 | End: 2023-12-28

## 2023-12-18 NOTE — PATIENT INSTRUCTIONS
Fluids. Rest. Nasal saline rinses  Symptom management for supportive care such as decongestants, tylenol/motrin as needed for fever or discomfort.  Use a cool mist humidifier at bedtime.    Flonase as directed. Augmentin 875mg twice daily for 10 days. Finish antibiotics as prescribed. Recommend taking antibiotics with food.   Take probiotic while taking antibiotics to minimize gastrointestinal side effects.   Warm compresses to facilitate nasal drainage.   Call or return to office if symptoms worsen or if new symptoms develop.

## 2023-12-18 NOTE — PROGRESS NOTES
Name: Kelly Haddad      : 1985      MRN: [id number]  Encounter Provider: TONY Vargas  Encounter Date: 2023   Encounter department: [facility]    Assessment & Plan   1. Acute non-recurrent maxillary sinusitis  Fluids. Rest. Nasal saline rinses  Symptom management for supportive care such as decongestants, tylenol/motrin as needed for fever or discomfort.  Use a cool mist humidifier at bedtime.    Flonase as directed. Augmentin 875mg twice daily for 10 days. Finish antibiotics as prescribed. Recommend taking antibiotics with food.   Take probiotic while taking antibiotics to minimize gastrointestinal side effects.   Warm compresses to facilitate nasal drainage.   Call or return to office if symptoms worsen or if new symptoms develop.   - amoxicillin-clavulanate (AUGMENTIN) 875-125 mg per tablet; Take 1 tablet by mouth every 12 (twelve) hours for 10 days  Dispense: 20 tablet; Refill: 0      Subjective      Here for ongoing sinus pain/congestion.   Started with uri symptoms over 2 weeks ago. Nasal congestion, pnd, cough  Has been doing flonase and saline rinses  Getting worse for past 2 days. Dental pain. Maxillary sinus pressure. Yesterday started with bloody nasal discharge.         Review of Systems   Constitutional:  Negative for fatigue and fever.   HENT:  Positive for congestion, postnasal drip, sinus pressure and sinus pain. Negative for rhinorrhea and sore throat.    Respiratory:  Positive for cough. Negative for shortness of breath.    Gastrointestinal:  Negative for diarrhea, nausea and vomiting.   Musculoskeletal:  Positive for myalgias.   Skin:  Negative for rash.   Neurological:  Negative for dizziness and headaches.   Hematological:  Negative for adenopathy.       Current Outpatient Medications on File Prior to Visit   Medication Sig    [DISCONTINUED] hydrocortisone 2.5 % cream Apply topically 2 (two) times a day as needed for rash    [DISCONTINUED]  "predniSONE 10 mg tablet 6 tab qd x 3 then 5 tab qd x 3 then 4 tab qd x 3 then 3 tab x3 then 2 tab qd x 3 then 1 tab qd x 3       Objective     /70 (BP Location: Right arm, Patient Position: Sitting, Cuff Size: Standard)   Pulse 94   Temp (!) 97 °F (36.1 °C)   Resp 16   Ht 5' 2\" (1.575 m)   Wt 75.8 kg (167 lb)   SpO2 98%   BMI 30.54 kg/m²     Physical Exam  Vitals reviewed.   Constitutional:       General: She is not in acute distress.     Appearance: She is not ill-appearing.   HENT:      Right Ear: Tympanic membrane and ear canal normal.      Left Ear: Tympanic membrane and ear canal normal.      Nose: Congestion present.      Comments: Turbinates inflamed.   Pain with palpation over maxillary sinus area.        Mouth/Throat:      Mouth: Mucous membranes are moist.      Pharynx: Oropharynx is clear.   Eyes:      General:         Right eye: No discharge.         Left eye: No discharge.   Cardiovascular:      Rate and Rhythm: Normal rate and regular rhythm.   Pulmonary:      Effort: Pulmonary effort is normal. No respiratory distress.      Breath sounds: Normal breath sounds. No wheezing or rales.   Neurological:      Mental Status: She is alert.   Psychiatric:         Mood and Affect: Mood normal.         Behavior: Behavior normal.         Thought Content: Thought content normal.         Judgment: Judgment normal.       TONY Vargas    "

## 2023-12-19 ENCOUNTER — TELEPHONE (OUTPATIENT)
Dept: ADMINISTRATIVE | Facility: OTHER | Age: 38
End: 2023-12-19

## 2023-12-19 NOTE — TELEPHONE ENCOUNTER
Upon review of the In Basket request and the patient's chart, initial outreach has been made via fax to facility. Please see Contacts section for details.     Thank you  Elena Guillen

## 2023-12-19 NOTE — LETTER
Vaccination Request Form: Influenza      Date Requested: 23  Patient: Kelly Haddad  Patient : 1985   Referring Provider: TONY Vargas       The above patient has informed us that they have had their   most recent Influenza administered at your facility. Please   complete this form and attach all corresponding documentation.    Date of Vaccine(s) Given  ______________________________    Lot Number(s) _______________________________________    Manufacture(s) ______________________________________    Dose Amount (s) _____________________________________    Expiration Date(s) ____________________________________    Comments __________________________________________________________  ________________________________2023____________________________________  ____________________________________________________________________  ____________________________________________________________________    Administering Facility  ________________________________________________    Vaccine Administered By (print name) ___________________________________      Form Completed By (print name) _______________________________________      Signature ___________________________________________________________      These reports are needed for  compliance.    Please fax this completed form and a copy of the Vaccine Document(s) to our office located at [address] [place] [address] as soon as possible to Fax [phone] marla Parker: Phone [phone]    We thank you for your assistance in treating our mutual patient.

## 2023-12-19 NOTE — TELEPHONE ENCOUNTER
----- Message from Alia Vidal sent at 12/18/2023 11:54 AM EST -----  Regarding: FLu shot  12/18/23 11:54 AM    Hello, our patient attached above has had Immunization(s) completed/performed. Please assist in updating the patient chart by making an External outreach to [facility] facility located in [place]. The date of service is 2023.    Thank you,  Alia TY

## 2023-12-26 NOTE — TELEPHONE ENCOUNTER
As a follow-up, a second attempt has been made for outreach via fax to facility. Please see Contacts section for details.    Thank you  Elena Guillen

## 2024-01-02 NOTE — TELEPHONE ENCOUNTER
As a final attempt, a third outreach has been made via telephone call to facility. Please see Contacts section for details. This encounter will be closed and completed by end of day. Should we receive the requested information because of previous outreach attempts, the requested patient's chart will be updated appropriately. Left message for pharmacy to fax over patient's Flu shot information.    Thank you  Elena Guillen

## 2024-08-06 ENCOUNTER — OFFICE VISIT (OUTPATIENT)
Dept: FAMILY MEDICINE CLINIC | Facility: CLINIC | Age: 39
End: 2024-08-06
Payer: COMMERCIAL

## 2024-08-06 VITALS
WEIGHT: 194.6 LBS | DIASTOLIC BLOOD PRESSURE: 70 MMHG | RESPIRATION RATE: 18 BRPM | SYSTOLIC BLOOD PRESSURE: 128 MMHG | TEMPERATURE: 97.4 F | HEART RATE: 78 BPM | BODY MASS INDEX: 35.59 KG/M2

## 2024-08-06 DIAGNOSIS — R05.8 POST-VIRAL COUGH SYNDROME: Primary | ICD-10-CM

## 2024-08-06 PROCEDURE — 99213 OFFICE O/P EST LOW 20 MIN: CPT | Performed by: NURSE PRACTITIONER

## 2024-08-06 RX ORDER — PREDNISONE 20 MG/1
20 TABLET ORAL DAILY
Qty: 7 TABLET | Refills: 0 | Status: SHIPPED | OUTPATIENT
Start: 2024-08-06 | End: 2024-08-13

## 2024-08-06 RX ORDER — ALBUTEROL SULFATE 90 UG/1
2 AEROSOL, METERED RESPIRATORY (INHALATION) EVERY 6 HOURS PRN
Qty: 18 G | Refills: 0 | Status: SHIPPED | OUTPATIENT
Start: 2024-08-06

## 2024-08-06 NOTE — PROGRESS NOTES
Ambulatory Visit  Name: Kelly Haddad      : 1985      MRN: [id number]  Encounter Provider: TONY Vargas  Encounter Date: 2024   Encounter department: [facility]    Assessment & Plan   1. Post-viral cough syndrome  Prednisone 20mg twice daily with food for one week.   Rescue inhaler 2 puffs as needed for shortness breath, chest tightness, bronchospasm, coughing fits.   Call or return to office if symptoms worsen or if new symptoms develop.   - albuterol (Ventolin HFA) 90 mcg/act inhaler; Inhale 2 puffs every 6 (six) hours as needed for wheezing  Dispense: 18 g; Refill: 0  - predniSONE 20 mg tablet; Take 1 tablet (20 mg total) by mouth daily for 7 days  Dispense: 7 tablet; Refill: 0       History of Present Illness     Here for productive cough   Had sinus infection when returned from vacation 2024  Treated with course of augmentin  Felt much better  Cough lingering. Productive. Does not feel sick. Cough triggered by certain things such as humidity or exercise.   No history of asthma.         Cough  This is a new problem. The current episode started more than 1 month ago. The problem has been waxing and waning. The problem occurs every few hours. The cough is Non-productive. Pertinent negatives include no chest pain, chills, ear congestion, ear pain, fever, headaches, heartburn, hemoptysis, myalgias, nasal congestion, postnasal drip, rash, rhinorrhea, sore throat, shortness of breath, sweats, weight loss or wheezing. The symptoms are aggravated by exercise.       Review of Systems   Constitutional:  Negative for chills, fever and weight loss.   HENT:  Negative for ear pain, postnasal drip, rhinorrhea and sore throat.    Respiratory:  Positive for cough. Negative for hemoptysis, shortness of breath and wheezing.    Cardiovascular:  Negative for chest pain.   Gastrointestinal:  Negative for heartburn.   Musculoskeletal:  Negative for myalgias.   Skin:  Negative for  rash.   Neurological:  Negative for headaches.       Objective     /70   Pulse 78   Temp (!) 97.4 °F (36.3 °C)   Resp 18   Wt 88.3 kg (194 lb 9.6 oz)   LMP 08/04/2024 (Exact Date)   Breastfeeding Unknown   BMI 35.59 kg/m²     Physical Exam  Vitals reviewed.   Constitutional:       General: She is not in acute distress.     Appearance: She is not ill-appearing.   Cardiovascular:      Rate and Rhythm: Normal rate.   Pulmonary:      Effort: Pulmonary effort is normal. No respiratory distress.      Breath sounds: No wheezing.      Comments: Very slightly diminished breath sounds bases b/l  Skin:     Coloration: Skin is not pale.   Neurological:      Mental Status: She is alert and oriented to person, place, and time.   Psychiatric:         Mood and Affect: Mood normal.       Administrative Statements

## 2024-08-06 NOTE — PATIENT INSTRUCTIONS
Prednisone 20mg twice daily with food for one week.   Rescue inhaler 2 puffs as needed for shortness breath, chest tightness, bronchospasm, coughing fits.   Call or return to office if symptoms worsen or if new symptoms develop.

## 2024-10-04 ENCOUNTER — OFFICE VISIT (OUTPATIENT)
Dept: FAMILY MEDICINE CLINIC | Facility: CLINIC | Age: 39
End: 2024-10-04
Payer: COMMERCIAL

## 2024-10-04 VITALS
DIASTOLIC BLOOD PRESSURE: 70 MMHG | RESPIRATION RATE: 18 BRPM | TEMPERATURE: 97.5 F | HEART RATE: 90 BPM | BODY MASS INDEX: 28.28 KG/M2 | SYSTOLIC BLOOD PRESSURE: 116 MMHG | WEIGHT: 154.6 LBS

## 2024-10-04 DIAGNOSIS — K12.30: Primary | ICD-10-CM

## 2024-10-04 PROCEDURE — 99213 OFFICE O/P EST LOW 20 MIN: CPT | Performed by: NURSE PRACTITIONER

## 2024-10-04 RX ORDER — PENICILLIN V POTASSIUM 500 MG/1
500 TABLET, FILM COATED ORAL EVERY 8 HOURS SCHEDULED
Qty: 30 TABLET | Refills: 0 | Status: SHIPPED | OUTPATIENT
Start: 2024-10-04 | End: 2024-10-14

## 2024-10-04 NOTE — PROGRESS NOTES
Ambulatory Visit  Name: Kelly Haddad      : 1985      MRN: [id number]  Encounter Provider: TONY Vargas  Encounter Date: 10/4/2024   Encounter department: [facility]    Assessment & Plan  Infection of oral mucosa  PenVK 500mg three times daily for 10 days.   Follow up with dentist as scheduled.   Orders:    penicillin V potassium (VEETID) 500 mg tablet; Take 1 tablet (500 mg total) by mouth every 8 (eight) hours for 10 days     Patient was counseled regarding instructions for management which included: impression/diagnosis, risk/benefits of treatment options, importance of compliance with treatment, risk factor reduction, and prognosis.   I have reviewed the instructions with the patient answering all questions and patient verbalized understanding.    History of Present Illness     Pain inside mouth, right side  Had dental work about one month ago.   Has area where was injected and pain radiates up to the ear.   Terrible taste in mouth and is able to see inside of cheek if red and tender to touch.   No fever.   No dental pain.   Concerned about possible infection.             Review of Systems   Constitutional:  Negative for fever.   HENT:  Negative for sore throat.         Painful area inside mouth/inner cheek red and painful and swollen   Allergic/Immunologic: Negative for immunocompromised state.           Objective     /70   Pulse 90   Temp 97.5 °F (36.4 °C)   Resp 18   Wt 70.1 kg (154 lb 9.6 oz)   LMP 2024 (Exact Date)   Breastfeeding Unknown   BMI 28.28 kg/m²     Physical Exam  Vitals reviewed.   Constitutional:       General: She is not in acute distress.  HENT:      Mouth/Throat:      Mouth: Mucous membranes are moist.      Pharynx: Oropharynx is clear.      Comments: Area right upper oral mucosa, tender, inflamed, mildly fluctuant    Cardiovascular:      Rate and Rhythm: Normal rate.   Pulmonary:      Effort: Pulmonary effort is normal.    Neurological:      Mental Status: She is alert and oriented to person, place, and time.   Psychiatric:         Mood and Affect: Mood normal.

## 2024-10-29 ENCOUNTER — OFFICE VISIT (OUTPATIENT)
Dept: FAMILY MEDICINE CLINIC | Facility: CLINIC | Age: 39
End: 2024-10-29
Payer: COMMERCIAL

## 2024-10-29 VITALS
WEIGHT: 158.8 LBS | TEMPERATURE: 97 F | DIASTOLIC BLOOD PRESSURE: 80 MMHG | BODY MASS INDEX: 29.22 KG/M2 | SYSTOLIC BLOOD PRESSURE: 116 MMHG | HEIGHT: 62 IN | RESPIRATION RATE: 18 BRPM | HEART RATE: 98 BPM

## 2024-10-29 DIAGNOSIS — B35.3 TINEA PEDIS OF RIGHT FOOT: Primary | ICD-10-CM

## 2024-10-29 PROCEDURE — 99213 OFFICE O/P EST LOW 20 MIN: CPT | Performed by: NURSE PRACTITIONER

## 2024-10-29 RX ORDER — CLOTRIMAZOLE AND BETAMETHASONE DIPROPIONATE 10; .64 MG/G; MG/G
CREAM TOPICAL 2 TIMES DAILY
Qty: 15 G | Refills: 1 | Status: SHIPPED | OUTPATIENT
Start: 2024-10-29

## 2024-10-29 NOTE — PATIENT INSTRUCTIONS
Lotrisone cream twice daily  Keep area dry  Call or return to office if symptoms worsen or if new symptoms develop.

## 2024-10-29 NOTE — PROGRESS NOTES
"Ambulatory Visit  Name: Kelly Haddad      : 1985      MRN: [id number]  Encounter Provider: TONY Vargas  Encounter Date: 10/29/2024   Encounter department: [facility]    Assessment & Plan  Tinea pedis of right foot  Orders:    clotrimazole-betamethasone (LOTRISONE) 1-0.05 % cream; Apply topically 2 (two) times a day       History of Present Illness     Rash between 4th and 5th toes right foot. 4-5 weeks.   Started 2% ketoconazole cream  Worse last 2 weeks  Dry, scaly, itchy, tender      Rash  This is a new problem. The current episode started 1 to 4 weeks ago. The problem has been rapidly worsening since onset. The affected locations include the right toes. The rash is characterized by burning, pain, redness, swelling, itchiness and peeling. She was exposed to nothing. Pertinent negatives include no anorexia, congestion, cough, diarrhea, facial edema, fatigue, fever, joint pain, rhinorrhea, shortness of breath, sore throat or vomiting.         Review of Systems   Constitutional:  Negative for fatigue and fever.   HENT:  Negative for congestion, rhinorrhea and sore throat.    Eyes:  Negative for pain.   Respiratory:  Negative for cough and shortness of breath.    Gastrointestinal:  Negative for anorexia, diarrhea and vomiting.   Musculoskeletal:  Negative for joint pain.   Skin:  Positive for rash.           Objective     /80   Pulse 98   Temp (!) 97 °F (36.1 °C)   Resp 18   Ht 5' 2\" (1.575 m)   Wt 72 kg (158 lb 12.8 oz)   LMP 10/26/2024 (Exact Date)   BMI 29.04 kg/m²     Physical Exam  Vitals reviewed.   Constitutional:       General: She is not in acute distress.  Skin:     Findings: Rash present.      Comments: Skin between right fourth and fifth toes, scaly, dry  Mildly tender to touch  No induration or fluctuance. No signs of infection.    Neurological:      Mental Status: She is alert and oriented to person, place, and time.   Psychiatric:         Mood " and Affect: Mood normal.         Behavior: Behavior normal.

## 2025-03-03 ENCOUNTER — OFFICE VISIT (OUTPATIENT)
Dept: FAMILY MEDICINE CLINIC | Facility: CLINIC | Age: 40
End: 2025-03-03
Payer: COMMERCIAL

## 2025-03-03 VITALS
SYSTOLIC BLOOD PRESSURE: 122 MMHG | DIASTOLIC BLOOD PRESSURE: 80 MMHG | RESPIRATION RATE: 18 BRPM | TEMPERATURE: 96 F | HEART RATE: 86 BPM | OXYGEN SATURATION: 99 % | HEIGHT: 62 IN | BODY MASS INDEX: 30.59 KG/M2 | WEIGHT: 166.2 LBS

## 2025-03-03 DIAGNOSIS — J01.40 ACUTE NON-RECURRENT PANSINUSITIS: Primary | ICD-10-CM

## 2025-03-03 PROCEDURE — 99214 OFFICE O/P EST MOD 30 MIN: CPT | Performed by: NURSE PRACTITIONER

## 2025-03-03 RX ORDER — FLUTICASONE PROPIONATE 50 MCG
1 SPRAY, SUSPENSION (ML) NASAL 2 TIMES DAILY
Qty: 16 G | Refills: 0 | Status: SHIPPED | OUTPATIENT
Start: 2025-03-03

## 2025-03-03 NOTE — PROGRESS NOTES
"Name: Kelly Haddad      : 1985      MRN: [id number]  Encounter Provider: TONY Vargas  Encounter Date: 3/3/2025   Encounter department: [facility]  :  Assessment & Plan  Acute non-recurrent pansinusitis  Augmentin 875mg twice daily with food for 7 days  Fluids. Rest. Nasal saline rinses  Symptom management for supportive care such as decongestants, tylenol/motrin as needed for fever or discomfort.  Use a cool mist humidifier at bedtime.    Flonase as directed. Finish antibiotics as prescribed.   Warm compresses to facilitate nasal drainage.   Orders:    amoxicillin-clavulanate (AUGMENTIN) 875-125 mg per tablet; Take 1 tablet by mouth every 12 (twelve) hours for 7 days    fluticasone (FLONASE) 50 mcg/act nasal spray; 1 spray into each nostril 2 (two) times a day           History of Present Illness   Here for possible sinus infection  Symptoms started about 10 days ago.   Initially with nasal congestion.   Getting worse.   Thick nasal discharge, now bloody  Pain right side of head and under right eye.   Cough. Low grade temp this am 99.9  Has tried otc decongestants, delsym, nasal rinses, humidifier.   Did home flu testing last week,  negative.   Both eyes were a little crusty this am. No redness. No pain. Resolved currently        Review of Systems   Constitutional:  Positive for fever (low grade).   HENT:  Positive for sinus pressure and sinus pain.    Respiratory:  Positive for cough. Negative for shortness of breath.    Gastrointestinal:  Negative for nausea.   Musculoskeletal:  Negative for arthralgias and myalgias.   Allergic/Immunologic: Negative for immunocompromised state.   Neurological:  Negative for dizziness.       Objective   /80   Pulse 86   Temp (!) 96 °F (35.6 °C)   Resp 18   Ht 5' 2\" (1.575 m)   Wt 75.4 kg (166 lb 3.2 oz)   LMP 2025 (Approximate)   SpO2 99%   BMI 30.40 kg/m²      Physical Exam  Vitals reviewed.   Constitutional:      "  General: She is not in acute distress.     Appearance: She is not ill-appearing.   HENT:      Right Ear: Tympanic membrane normal.      Left Ear: Tympanic membrane normal.      Ears:      Comments: Both canals slightly erythematous     Nose: Congestion present.      Comments: Turbinates inflamed, right worse than left  Pain with palpation over right maxillary sinus area and right frontal area     Mouth/Throat:      Mouth: Mucous membranes are moist.      Pharynx: Oropharynx is clear.   Eyes:      General:         Right eye: No discharge.         Left eye: No discharge.      Conjunctiva/sclera: Conjunctivae normal.   Cardiovascular:      Rate and Rhythm: Normal rate and regular rhythm.   Pulmonary:      Effort: Pulmonary effort is normal. No respiratory distress.      Breath sounds: Normal breath sounds. No wheezing or rales.   Skin:     General: Skin is warm and dry.      Coloration: Skin is not jaundiced or pale.   Neurological:      General: No focal deficit present.      Mental Status: She is alert and oriented to person, place, and time.      Cranial Nerves: No cranial nerve deficit.      Sensory: No sensory deficit.   Psychiatric:         Mood and Affect: Mood normal.         Behavior: Behavior normal.         Thought Content: Thought content normal.         Judgment: Judgment normal.

## 2025-03-03 NOTE — PATIENT INSTRUCTIONS
Augmentin 875mg twice daily with food for 7 days  Fluids. Rest. Nasal saline rinses  Symptom management for supportive care such as decongestants, tylenol/motrin as needed for fever or discomfort.  Use a cool mist humidifier at bedtime.    Flonase as directed. Finish antibiotics as prescribed.   Warm compresses to facilitate nasal drainage.

## 2025-03-04 DIAGNOSIS — H10.33 ACUTE CONJUNCTIVITIS OF BOTH EYES, UNSPECIFIED ACUTE CONJUNCTIVITIS TYPE: Primary | ICD-10-CM

## 2025-03-04 RX ORDER — TOBRAMYCIN 3 MG/ML
1 SOLUTION/ DROPS OPHTHALMIC 4 TIMES DAILY
Qty: 5 ML | Refills: 0 | Status: SHIPPED | OUTPATIENT
Start: 2025-03-04 | End: 2025-03-09